# Patient Record
Sex: MALE | Race: WHITE | NOT HISPANIC OR LATINO | Employment: OTHER | URBAN - METROPOLITAN AREA
[De-identification: names, ages, dates, MRNs, and addresses within clinical notes are randomized per-mention and may not be internally consistent; named-entity substitution may affect disease eponyms.]

---

## 2017-01-09 ENCOUNTER — PATIENT MESSAGE (OUTPATIENT)
Dept: INTERNAL MEDICINE | Facility: CLINIC | Age: 72
End: 2017-01-09

## 2017-01-10 ENCOUNTER — DOCUMENTATION ONLY (OUTPATIENT)
Dept: INTERNAL MEDICINE | Facility: CLINIC | Age: 72
End: 2017-01-10

## 2017-01-10 NOTE — PROGRESS NOTES
Tried to call pt on number given in e-mail, no answer.  Left message to call clinic with any concerns, discuss what labs to order.  Also advised again that can have labs drawn immediately upon bryan, lab and I am available starting 7:30 AM

## 2017-01-11 ENCOUNTER — OFFICE VISIT (OUTPATIENT)
Dept: INTERNAL MEDICINE | Facility: CLINIC | Age: 72
End: 2017-01-11
Payer: MEDICARE

## 2017-01-11 ENCOUNTER — DOCUMENTATION ONLY (OUTPATIENT)
Dept: INTERNAL MEDICINE | Facility: CLINIC | Age: 72
End: 2017-01-11

## 2017-01-11 VITALS
DIASTOLIC BLOOD PRESSURE: 61 MMHG | SYSTOLIC BLOOD PRESSURE: 110 MMHG | BODY MASS INDEX: 27.72 KG/M2 | HEIGHT: 74 IN | HEART RATE: 64 BPM | WEIGHT: 216 LBS

## 2017-01-11 DIAGNOSIS — E03.9 HYPOTHYROIDISM, UNSPECIFIED TYPE: ICD-10-CM

## 2017-01-11 DIAGNOSIS — Z87.438 HISTORY OF BPH: ICD-10-CM

## 2017-01-11 DIAGNOSIS — N18.30 CHRONIC RENAL FAILURE, STAGE 3 (MODERATE): ICD-10-CM

## 2017-01-11 DIAGNOSIS — I10 ESSENTIAL HYPERTENSION: Primary | ICD-10-CM

## 2017-01-11 DIAGNOSIS — E03.9 HYPOTHYROIDISM, UNSPECIFIED TYPE: Primary | ICD-10-CM

## 2017-01-11 DIAGNOSIS — Z00.00 PREVENTATIVE HEALTH CARE: Primary | ICD-10-CM

## 2017-01-11 DIAGNOSIS — R74.8 ABNORMAL LIVER ENZYMES: ICD-10-CM

## 2017-01-11 DIAGNOSIS — E78.5 HYPERLIPIDEMIA, UNSPECIFIED HYPERLIPIDEMIA TYPE: ICD-10-CM

## 2017-01-11 PROCEDURE — 99213 OFFICE O/P EST LOW 20 MIN: CPT | Mod: PBBFAC | Performed by: FAMILY MEDICINE

## 2017-01-11 PROCEDURE — 1157F ADVNC CARE PLAN IN RCRD: CPT | Mod: ,,, | Performed by: FAMILY MEDICINE

## 2017-01-11 PROCEDURE — 3074F SYST BP LT 130 MM HG: CPT | Mod: ,,, | Performed by: FAMILY MEDICINE

## 2017-01-11 PROCEDURE — 99999 PR PBB SHADOW E&M-EST. PATIENT-LVL III: CPT | Mod: PBBFAC,,, | Performed by: FAMILY MEDICINE

## 2017-01-11 PROCEDURE — 3078F DIAST BP <80 MM HG: CPT | Mod: ,,, | Performed by: FAMILY MEDICINE

## 2017-01-11 PROCEDURE — 1126F AMNT PAIN NOTED NONE PRSNT: CPT | Mod: ,,, | Performed by: FAMILY MEDICINE

## 2017-01-11 PROCEDURE — 99214 OFFICE O/P EST MOD 30 MIN: CPT | Mod: S$PBB,,, | Performed by: FAMILY MEDICINE

## 2017-01-11 PROCEDURE — 1159F MED LIST DOCD IN RCRD: CPT | Mod: ,,, | Performed by: FAMILY MEDICINE

## 2017-01-11 NOTE — MR AVS SNAPSHOT
[facility] - Internal Medicine  [address]  [facility] [address]  Phone: [phone]  Fax: [phone]                  Cesar Kapadia Jr.   2017 11:00 AM   Office Visit    Description:  Male : 1945   Provider:  Julio Blankenship MD   Department:  [facility] - Internal Medicine           Reason for Visit     Annual Exam           Diagnoses this Visit        Comments    Essential hypertension    -  Primary     Hypothyroidism, unspecified type         History of BPH         Hyperlipidemia, unspecified hyperlipidemia type         Abnormal liver enzymes         Chronic renal failure, stage 3 (moderate)                To Do List           Future Appointments        Provider Department Dept Phone    2017 10:20 AM NELSON Nino MD [place] - Ophthalmology [phone]      Goals (5 Years of Data)     None      Follow-Up and Disposition     Return in about 6 months (around 2017), or if symptoms worsen or fail to improve.      [facility] On Call     [facility] On Call Nurse Care Line -  Assistance  Registered nurses in the [facility] On Call Center provide clinical advisement, health education, appointment booking, and other advisory services.  Call for this free service at [phone].             Medications           Message regarding Medications     Verify the changes and/or additions to your medication regime listed below are the same as discussed with your clinician today.  If any of these changes or additions are incorrect, please notify your healthcare provider.             Verify that the below list of medications is an accurate representation of the medications you are currently taking.  If none reported, the list may be blank. If incorrect, please contact your healthcare provider. Carry this list with you in case of emergency.           Current Medications     ANTIOX#10/OM3/DHA/EPA/LUT/ZEAX (I-CAPS ORAL) Take 1 tablet by mouth 2 (two) times daily.     carvedilol (COREG)  "12.5 MG tablet Take 1 tablet (12.5 mg total) by mouth 2 (two) times daily with meals.    enalapril (VASOTEC) 20 MG tablet Take 1 tablet (20 mg total) by mouth once daily.    hydrochlorothiazide (HYDRODIURIL) 25 MG tablet Take 1 tablet (25 mg total) by mouth once daily.    ketoconazole (NIZORAL) 2 % shampoo Apply topically every other day.    levothyroxine (SYNTHROID) 25 MCG tablet Take 1 tablet (25 mcg total) by mouth once daily.    ofloxacin (OCUFLOX) 0.3 % ophthalmic solution Place 1 drop into the left eye 3 (three) times daily.    prednisoLONE acetate (PRED FORTE) 1 % DrpS Place 1 drop into the left eye 3 (three) times daily.    rosuvastatin (CRESTOR) 10 MG tablet Take 1 tablet (10 mg total) by mouth once daily.    ZOSTAVAX, PF, 19,400 unit/0.65 mL injection            Clinical Reference Information           Vital Signs - Last Recorded  Most recent update: 1/11/2017 11:02 AM by Julio Blankenship MD    BP Pulse Ht Wt BMI    110/61 64 6' 2" (1.88 m) 98 kg (216 lb) 27.73 kg/m2      Blood Pressure          Most Recent Value    BP  110/61      Allergies as of 1/11/2017     No Known Allergies      Immunizations Administered on Date of Encounter - 1/11/2017     None      "

## 2017-01-11 NOTE — PROGRESS NOTES
Called pt, left detailed ms that labs look good, TSH a little high T4 wnl, asked to use thyroid pill alone, next labs 4-6 mo.  Can call or e-mail with questions.

## 2017-01-11 NOTE — PROGRESS NOTES
Subjective:       Patient ID: Cesar Kapadia Jr. is a 72 y.o. male.    Chief Complaint: Annual Exam  Cesar Kapadia Jr. 72 y.o. male is here for office visit to review care and physical exam, here for usual care.  Feels baseline.  Did have recent URI, had three things prescribed, symptoms resolved.  Uses prescription meds as usual otherwise.  Note had usual labs this AM, Cr slightly higher as well as ALT.      HPI  Review of Systems   Constitutional: Negative for activity change, appetite change, fatigue, fever and unexpected weight change.   HENT: Negative for congestion, hearing loss, postnasal drip and rhinorrhea.    Eyes: Negative for pain, discharge and visual disturbance.   Respiratory: Negative for cough, choking and shortness of breath.    Cardiovascular: Negative for chest pain, palpitations and leg swelling.   Gastrointestinal: Negative for abdominal pain, diarrhea and vomiting.   Genitourinary: Negative for dysuria, flank pain, hematuria and urgency.   Musculoskeletal: Negative for arthralgias, back pain, joint swelling and neck pain.   Skin: Negative for color change and rash.   Neurological: Negative for dizziness, tremors, syncope, weakness, numbness and headaches.   Psychiatric/Behavioral: Negative for agitation and confusion. The patient is not hyperactive.        Objective:      Physical Exam   Constitutional: He is oriented to person, place, and time. He appears well-developed and well-nourished.   HENT:   Head: Normocephalic.   Eyes: EOM are normal. Pupils are equal, round, and reactive to light.   Neck: Normal range of motion. Neck supple. No thyromegaly present.   Cardiovascular: Normal rate.  Exam reveals no gallop and no friction rub.    No murmur heard.  Pulmonary/Chest: Effort normal. No respiratory distress. He has no wheezes.   Abdominal: Soft. Bowel sounds are normal. He exhibits no mass. There is no tenderness.   Musculoskeletal: He exhibits no edema or tenderness.   Lymphadenopathy:      He has no cervical adenopathy.   Neurological: He is alert and oriented to person, place, and time. He has normal reflexes. No cranial nerve deficit.   Skin: Skin is warm. No rash noted.   Psychiatric: He has a normal mood and affect. His behavior is normal.       Assessment:       No diagnosis found.    Plan:       Cesar was seen today for annual exam.    Diagnoses and all orders for this visit:    Essential hypertension  - controle, follow at home, report  Hypothyroidism, unspecified type  - reviewed  History of BPH  - Follow  Hyperlipidemia, unspecified hyperlipidemia type  -review  Abnormal liver enzymes  - repeat 2-4 weeks, avoid otc meds  Chronic renal failure, stage 3 (moderate)  - may be dehydrated, repeat 2-4 weeks

## 2017-01-12 ENCOUNTER — PROCEDURE VISIT (OUTPATIENT)
Dept: OPHTHALMOLOGY | Facility: CLINIC | Age: 72
End: 2017-01-12
Attending: OPHTHALMOLOGY
Payer: MEDICARE

## 2017-01-12 VITALS — HEART RATE: 72 BPM | SYSTOLIC BLOOD PRESSURE: 94 MMHG | DIASTOLIC BLOOD PRESSURE: 57 MMHG

## 2017-01-12 DIAGNOSIS — H35.3220 EXUDATIVE AGE-RELATED MACULAR DEGENERATION OF LEFT EYE: ICD-10-CM

## 2017-01-12 DIAGNOSIS — H35.723 RETINAL PIGMENT EPITHELIAL DETACHMENT, BILATERAL: ICD-10-CM

## 2017-01-12 DIAGNOSIS — H35.3221 EXUDATIVE AGE-RELATED MACULAR DEGENERATION OF LEFT EYE WITH ACTIVE CHOROIDAL NEOVASCULARIZATION: Primary | ICD-10-CM

## 2017-01-12 DIAGNOSIS — H47.011 ANTERIOR ISCHEMIC OPTIC NEUROPATHY, RIGHT: ICD-10-CM

## 2017-01-12 PROCEDURE — 67028 INJECTION EYE DRUG: CPT | Mod: PBBFAC,PO,LT | Performed by: OPHTHALMOLOGY

## 2017-01-12 PROCEDURE — 67028 INJECTION EYE DRUG: CPT | Mod: PBBFAC

## 2017-01-12 PROCEDURE — 92014 COMPRE OPH EXAM EST PT 1/>: CPT | Mod: 25,S$PBB,, | Performed by: OPHTHALMOLOGY

## 2017-01-12 PROCEDURE — 67028 INJECTION EYE DRUG: CPT | Mod: S$PBB,LT,, | Performed by: OPHTHALMOLOGY

## 2017-01-12 PROCEDURE — 92134 CPTRZ OPH DX IMG PST SGM RTA: CPT | Mod: PBBFAC,PO | Performed by: OPHTHALMOLOGY

## 2017-01-12 RX ADMIN — AFLIBERCEPT 2 MG: 40 INJECTION, SOLUTION INTRAVITREAL at 11:01

## 2017-01-12 NOTE — PATIENT INSTRUCTIONS

## 2017-01-12 NOTE — PROGRESS NOTES
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 FVPED OD - s/p Eylea x 13  With subretinal fibrosis - OD, also history of AION.   Increase SRH/ARF and CME OD 10/13  Now stable CME OD - reactive from SR Fibrosis  In combination with AION, potential limited.  Try observation again OD    2. - Wet ARMD w CNV OS  - Pt responding to Avastin; ; However, Pt responds poorly to extend on Avastin.  -- Pt interested in switching to Eylea -  However, cystoid edema likely response to SR fibrosis, rather than sylvia new CME  Will do maintenance therapy OS - but try extension  S/p Eylea OS x20  s/p Avastin #14    6/16/14 - Pt presented with decreased Va OS from 20/40-20/80  No increase in SRF, fibrosis grossly stable, CME improved  Will monitor closely    7/14/14 - Va OS returned to normal over last month    5/15 - Worsened OS   Resumed q 4 week tx OS  WIll need tighter maintenance - will try alternating therapy     5/16 - stable resume 3 month maintenance    Eylea OS    -Patient visited [employer] and has magnifying glasses that help       3. H/o AION OD    4. NS OD  PCIOL OS - great result     5. Glc suspect - good IOP    Resume extension    3 months OCT      Risks, benefits, and alternatives to treatment discussed in detail with the patient.  The patient voiced understanding and wished to proceed with the procedure    Injection Procedure Note:  Diagnosis: Wet AMD OS    Topical Proparacaine and Betadine.  Inject Eylea OS at 6:00 @ 3.5-4mm posterior to limbus  Post Operative Dx: Same  Complications: None  Follow up as above.

## 2017-01-12 NOTE — MR AVS SNAPSHOT
[place] - Ophthalmology   [facility]  [place] [address]  Phone: [phone]  Fax: [phone]                  Cesar Kapadia Jr.   2017 10:20 AM   Procedure visit    Description:  Male : 1945   Provider:  NELSON Nino MD   Department:  [place] - Ophthalmology           Reason for Visit     Eye Problem           Diagnoses this Visit        Comments    Exudative age-related macular degeneration of left eye with active choroidal neovascularization    -  Primary     Exudative age-related macular degeneration of left eye         Retinal pigment epithelial detachment, bilateral         Anterior ischemic optic neuropathy, right                To Do List           Goals (5 Years of Data)     None      Follow-Up and Disposition     Return in about 3 months (around 2017).      [facility] On Call     [facility] On Call Nurse Care Line -  Assistance  Registered nurses in the [facility] On Call Center provide clinical advisement, health education, appointment booking, and other advisory services.  Call for this free service at [phone].             Medications           Message regarding Medications     Verify the changes and/or additions to your medication regime listed below are the same as discussed with your clinician today.  If any of these changes or additions are incorrect, please notify your healthcare provider.        These medications were administered today        Dose Freq    aflibercept Soln 2 mg 2 mg Clinic/HOD 1 time    Si.05 mLs (2 mg total) by Intravitreal route one time.    Class: Normal    Route: Intravitreal           Verify that the below list of medications is an accurate representation of the medications you are currently taking.  If none reported, the list may be blank. If incorrect, please contact your healthcare provider. Carry this list with you in case of emergency.           Current Medications     ANTIOX#10/OM3/DHA/EPA/LUT/ZEAX (I-CAPS  ORAL) Take 1 tablet by mouth 2 (two) times daily.     carvedilol (COREG) 12.5 MG tablet Take 1 tablet (12.5 mg total) by mouth 2 (two) times daily with meals.    enalapril (VASOTEC) 20 MG tablet Take 1 tablet (20 mg total) by mouth once daily.    hydrochlorothiazide (HYDRODIURIL) 25 MG tablet Take 1 tablet (25 mg total) by mouth once daily.    ketoconazole (NIZORAL) 2 % shampoo Apply topically every other day.    levothyroxine (SYNTHROID) 25 MCG tablet Take 1 tablet (25 mcg total) by mouth once daily.    ofloxacin (OCUFLOX) 0.3 % ophthalmic solution Place 1 drop into the left eye 3 (three) times daily.    prednisoLONE acetate (PRED FORTE) 1 % DrpS Place 1 drop into the left eye 3 (three) times daily.    rosuvastatin (CRESTOR) 10 MG tablet Take 1 tablet (10 mg total) by mouth once daily.    ZOSTAVAX, PF, 19,400 unit/0.65 mL injection            Clinical Reference Information           Vital Signs - Last Recorded  Most recent update: 1/12/2017 10:45 AM by Petty Harris    BP Pulse                (!) 94/57 (BP Location: Right arm, Patient Position: Sitting, BP Method: Automatic) 72          Blood Pressure          Most Recent Value    BP  (!)  94/57      Allergies as of 1/12/2017     No Known Allergies      Immunizations Administered on Date of Encounter - 1/12/2017     None      Orders Placed During Today's Visit      Normal Orders This Visit    Posterior Segment OCT Retina-Both eyes     Prior Authorization Order     Future Labs/Procedures Expected by Expires    Posterior Segment OCT Retina-Both eyes  As directed 1/12/2018

## 2017-04-13 ENCOUNTER — PROCEDURE VISIT (OUTPATIENT)
Dept: OPHTHALMOLOGY | Facility: CLINIC | Age: 72
End: 2017-04-13
Payer: MEDICARE

## 2017-04-13 DIAGNOSIS — H47.011 ANTERIOR ISCHEMIC OPTIC NEUROPATHY, RIGHT: ICD-10-CM

## 2017-04-13 DIAGNOSIS — H35.3221 EXUDATIVE AGE-RELATED MACULAR DEGENERATION OF LEFT EYE WITH ACTIVE CHOROIDAL NEOVASCULARIZATION: Primary | ICD-10-CM

## 2017-04-13 DIAGNOSIS — H35.3220 EXUDATIVE AGE-RELATED MACULAR DEGENERATION OF LEFT EYE: ICD-10-CM

## 2017-04-13 PROCEDURE — 67028 INJECTION EYE DRUG: CPT | Mod: PBBFAC,PO,LT | Performed by: OPHTHALMOLOGY

## 2017-04-13 PROCEDURE — 92134 CPTRZ OPH DX IMG PST SGM RTA: CPT | Mod: PBBFAC,PO | Performed by: OPHTHALMOLOGY

## 2017-04-13 PROCEDURE — 92014 COMPRE OPH EXAM EST PT 1/>: CPT | Mod: 25,S$PBB,, | Performed by: OPHTHALMOLOGY

## 2017-04-13 PROCEDURE — 67028 INJECTION EYE DRUG: CPT | Mod: S$PBB,LT,, | Performed by: OPHTHALMOLOGY

## 2017-04-13 RX ADMIN — AFLIBERCEPT 2 MG: 40 INJECTION, SOLUTION INTRAVITREAL at 11:04

## 2017-04-13 NOTE — MR AVS SNAPSHOT
[place] - Ophthalmology   [facility]  [place] [address]  Phone: [phone]  Fax: [phone]                  Cesar Kapadia Jr.   2017 10:10 AM   Procedure visit    Description:  Male : 1945   Provider:  NELSON Nino MD   Department:  [place] - Ophthalmology           Reason for Visit     Macular Degeneration           Diagnoses this Visit        Comments    Exudative age-related macular degeneration of left eye with active choroidal neovascularization    -  Primary     Exudative age-related macular degeneration of left eye         Anterior ischemic optic neuropathy, right                To Do List           Goals (5 Years of Data)     None      Follow-Up and Disposition     Return in about 3 months (around 2017).      [facility] On Call     [facility] On Call Nurse Care Line -  Assistance  Unless otherwise directed by your provider, please contact [facility], our nurse care line that is available for  assistance.     Registered nurses in the [facility] On Call Center provide: appointment scheduling, clinical advisement, health education, and other advisory services.  Call: [phone] (toll free)               Medications           Message regarding Medications     Verify the changes and/or additions to your medication regime listed below are the same as discussed with your clinician today.  If any of these changes or additions are incorrect, please notify your healthcare provider.        These medications were administered today        Dose Freq    aflibercept Soln 2 mg 2 mg Clinic/HOD 1 time    Si.05 mLs (2 mg total) by Intravitreal route one time.    Class: Normal    Route: Intravitreal           Verify that the below list of medications is an accurate representation of the medications you are currently taking.  If none reported, the list may be blank. If incorrect, please contact your healthcare provider. Carry this list with you in case of  emergency.           Current Medications     ANTIOX#10/OM3/DHA/EPA/LUT/ZEAX (I-CAPS ORAL) Take 1 tablet by mouth 2 (two) times daily.     carvedilol (COREG) 12.5 MG tablet Take 1 tablet (12.5 mg total) by mouth 2 (two) times daily with meals.    enalapril (VASOTEC) 20 MG tablet Take 1 tablet (20 mg total) by mouth once daily.    hydrochlorothiazide (HYDRODIURIL) 25 MG tablet Take 1 tablet (25 mg total) by mouth once daily.    ketoconazole (NIZORAL) 2 % shampoo Apply topically every other day.    levothyroxine (SYNTHROID) 25 MCG tablet Take 1 tablet (25 mcg total) by mouth once daily.    ofloxacin (OCUFLOX) 0.3 % ophthalmic solution Place 1 drop into the left eye 3 (three) times daily.    prednisoLONE acetate (PRED FORTE) 1 % DrpS Place 1 drop into the left eye 3 (three) times daily.    rosuvastatin (CRESTOR) 10 MG tablet Take 1 tablet (10 mg total) by mouth once daily.    ZOSTAVAX, PF, 19,400 unit/0.65 mL injection            Clinical Reference Information           Allergies as of 4/13/2017     No Known Allergies      Immunizations Administered on Date of Encounter - 4/13/2017     None      Orders Placed During Today's Visit      Normal Orders This Visit    Posterior Segment OCT Retina-Both eyes     Prior Authorization Order     Future Labs/Procedures Expected by Expires    Posterior Segment OCT Retina-Both eyes  As directed 4/13/2018      Language Assistance Services     ATTENTION: Language assistance services are available, free of charge. Please call [phone].      ATENCIÓN: Si michelle ace, tiene a luu disposición servicios gratuitos de asistencia lingüística. Llame al [phone].     [facility] Ý: N?u b?n nói Ti?ng Vi?t, có các d?ch v? h? tr? ngôn ng? mi?n phí dành cho b?n. G?i s? [phone].         [place] - Ophthalmology complies with applicable Federal civil rights laws and does not discriminate on the basis of race, color, national origin, age, disability, or sex.

## 2017-04-13 NOTE — PROGRESS NOTES
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 FVPED OD - s/p Eylea x 13  With subretinal fibrosis - OD, also history of AION.   Increase SRH/ARF and CME OD 10/13  Now stable CME OD - reactive from SR Fibrosis  In combination with AION, potential limited.  Try observation again OD    2. - Wet ARMD w CNV OS  - Pt responding to Avastin; ; However, Pt responds poorly to extend on Avastin.  -- Pt interested in switching to Eylea -  However, cystoid edema likely response to SR fibrosis, rather than sylvia new CME  Will do maintenance therapy OS - but try extension  S/p Eylea OS x21  s/p Avastin #14    6/16/14 - Pt presented with decreased Va OS from 20/40-20/80  No increase in SRF, fibrosis grossly stable, CME improved  Will monitor closely    7/14/14 - Va OS returned to normal over last month    5/15 - Worsened OS   Resumed q 4 week tx OS  WIll need tighter maintenance - will try alternating therapy     5/16 - stable resume 3 month maintenance    Eylea OS    -Patient visited [employer] and has magnifying glasses that help       3. H/o AION OD    4. NS OD  PCIOL OS - great result     5. Glc suspect - good IOP    Resume extension    3 months OCT      Risks, benefits, and alternatives to treatment discussed in detail with the patient.  The patient voiced understanding and wished to proceed with the procedure    Injection Procedure Note:  Diagnosis: Wet AMD OS    Topical Proparacaine and Betadine.  Inject Eylea OS at 6:00 @ 3.5-4mm posterior to limbus  Post Operative Dx: Same  Complications: None  Follow up as above.

## 2017-04-13 NOTE — PATIENT INSTRUCTIONS

## 2017-07-24 ENCOUNTER — PATIENT MESSAGE (OUTPATIENT)
Dept: OPHTHALMOLOGY | Facility: CLINIC | Age: 72
End: 2017-07-24

## 2017-07-24 ENCOUNTER — PROCEDURE VISIT (OUTPATIENT)
Dept: OPHTHALMOLOGY | Facility: CLINIC | Age: 72
End: 2017-07-24
Payer: MEDICARE

## 2017-07-24 VITALS — SYSTOLIC BLOOD PRESSURE: 126 MMHG | DIASTOLIC BLOOD PRESSURE: 70 MMHG

## 2017-07-24 DIAGNOSIS — H35.3221 EXUDATIVE AGE-RELATED MACULAR DEGENERATION OF LEFT EYE WITH ACTIVE CHOROIDAL NEOVASCULARIZATION: Primary | ICD-10-CM

## 2017-07-24 DIAGNOSIS — H47.011 ANTERIOR ISCHEMIC OPTIC NEUROPATHY, RIGHT: ICD-10-CM

## 2017-07-24 DIAGNOSIS — H35.723 RETINAL PIGMENT EPITHELIAL DETACHMENT, BILATERAL: ICD-10-CM

## 2017-07-24 PROCEDURE — 92014 COMPRE OPH EXAM EST PT 1/>: CPT | Mod: 25,S$PBB,, | Performed by: OPHTHALMOLOGY

## 2017-07-24 PROCEDURE — 92134 CPTRZ OPH DX IMG PST SGM RTA: CPT | Mod: PBBFAC | Performed by: OPHTHALMOLOGY

## 2017-07-24 PROCEDURE — 67028 INJECTION EYE DRUG: CPT | Mod: PBBFAC,LT | Performed by: OPHTHALMOLOGY

## 2017-07-24 PROCEDURE — 67028 INJECTION EYE DRUG: CPT | Mod: S$PBB,LT,, | Performed by: OPHTHALMOLOGY

## 2017-07-24 RX ADMIN — AFLIBERCEPT 2 MG: 40 INJECTION, SOLUTION INTRAVITREAL at 02:07

## 2017-07-24 NOTE — PROGRESS NOTES
HPI     DLS: 04/13/2017 Pt states his VA OS has not changed.              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 FVPED OD - s/p Eylea x 13  With subretinal fibrosis - OD, also history of AION.   Increase SRH/ARF and CME OD 10/13  Now stable CME OD - reactive from SR Fibrosis  In combination with AION, potential limited.  Try observation again OD    2. - Wet ARMD w CNV OS  - Pt responding to Avastin; ; However, Pt responds poorly to extend on Avastin.  -- Pt interested in switching to Eylea -  However, cystoid edema likely response to SR fibrosis, rather than sylvia new CME  Will do maintenance therapy OS - but try extension  S/p Eylea OS x22  s/p Avastin #14    6/16/14 - Pt presented with decreased Va OS from 20/40-20/80  No increase in SRF, fibrosis grossly stable, CME improved  Will monitor closely    7/14/14 - Va OS returned to normal over last month    5/15 - Worsened OS   Resumed q 4 week tx OS  WIll need tighter maintenance - will try alternating therapy     5/16 - stable resume 3 month maintenance    Eylea OS    -Patient visited [employer] and has magnifying glasses that help       3. H/o AION OD    4. NS OD  PCIOL OS - great result     5. Glc suspect - good IOP    Resume extension    3 months OCT      Risks, benefits, and alternatives to treatment discussed in detail with the patient.  The patient voiced understanding and wished to proceed with the procedure    Injection Procedure Note:  Diagnosis: Wet AMD OS    Topical Proparacaine and Betadine.  Inject Eylea OS at 6:00 @ 3.5-4mm posterior to limbus  Post Operative Dx: Same  Complications: None  Follow up as above.

## 2017-07-24 NOTE — PATIENT INSTRUCTIONS
POST INTRAVITREAL INJECTION PATIENT INSTRUCTIONS   Below are some guidelines for what to expect after your treatment and additional care instructions.   * you may experience mild discomfort in your eye after the treatment. Your eye usually feels better within 24 to 48 hours after the procedure.   * you have been given drops that numb the surface of your eye.   DO NOT rub or touch your eye, DO NOT wear contacts until numbness goes away.   * you may experience small spots that appear in your field of vision, these are usually caused by an air bubble or from the medication. It taked a few hours or days for these to go away.   * use of sunglasses will help reduce light sensitivity and glare.   * DO NOT swim or put sink water ( tap water ) or swin for at least 24 hours after the injection   * If you have a gritty, burning, irritating or stinging feeling in the injected eye. This may be a result of the antiseptic used. Use artifical tears or eye lubricant ( from over- the- counter from your local pharmacy ). If you have some at home already please check the expiration date, so not to use anything contaminated in your eye. A cool pack over your eye brow above the injected eye may also relieve discomfort.   Call us right away or go to the Emergency Department if you have a dramatic decrease in vision or extreme pain in the eye.   [facility] [phone]

## 2017-08-15 RX ORDER — LEVOTHYROXINE SODIUM 25 UG/1
25 TABLET ORAL DAILY
Qty: 30 TABLET | Refills: 6 | Status: SHIPPED | OUTPATIENT
Start: 2017-08-15

## 2017-10-09 ENCOUNTER — PATIENT MESSAGE (OUTPATIENT)
Dept: INTERNAL MEDICINE | Facility: CLINIC | Age: 72
End: 2017-10-09

## 2017-10-21 ENCOUNTER — TELEPHONE (OUTPATIENT)
Dept: INTERNAL MEDICINE | Facility: CLINIC | Age: 72
End: 2017-10-21

## 2017-10-24 ENCOUNTER — PROCEDURE VISIT (OUTPATIENT)
Dept: OPHTHALMOLOGY | Facility: CLINIC | Age: 72
End: 2017-10-24
Payer: MEDICARE

## 2017-10-24 ENCOUNTER — LAB VISIT (OUTPATIENT)
Dept: LAB | Facility: HOSPITAL | Age: 72
End: 2017-10-24
Attending: FAMILY MEDICINE
Payer: MEDICARE

## 2017-10-24 ENCOUNTER — OFFICE VISIT (OUTPATIENT)
Dept: INTERNAL MEDICINE | Facility: CLINIC | Age: 72
End: 2017-10-24
Payer: MEDICARE

## 2017-10-24 VITALS — DIASTOLIC BLOOD PRESSURE: 60 MMHG | SYSTOLIC BLOOD PRESSURE: 99 MMHG | HEART RATE: 57 BPM

## 2017-10-24 VITALS
BODY MASS INDEX: 29.31 KG/M2 | HEART RATE: 64 BPM | DIASTOLIC BLOOD PRESSURE: 80 MMHG | HEIGHT: 74 IN | SYSTOLIC BLOOD PRESSURE: 128 MMHG | WEIGHT: 228.38 LBS | OXYGEN SATURATION: 98 %

## 2017-10-24 DIAGNOSIS — R74.8 ABNORMAL LIVER ENZYMES: ICD-10-CM

## 2017-10-24 DIAGNOSIS — M54.50 CHRONIC BILATERAL LOW BACK PAIN WITHOUT SCIATICA: ICD-10-CM

## 2017-10-24 DIAGNOSIS — I10 ESSENTIAL HYPERTENSION: ICD-10-CM

## 2017-10-24 DIAGNOSIS — H35.3221 EXUDATIVE AGE-RELATED MACULAR DEGENERATION OF LEFT EYE WITH ACTIVE CHOROIDAL NEOVASCULARIZATION: Primary | ICD-10-CM

## 2017-10-24 DIAGNOSIS — E03.9 HYPOTHYROIDISM, UNSPECIFIED TYPE: ICD-10-CM

## 2017-10-24 DIAGNOSIS — N18.30 CHRONIC RENAL FAILURE, STAGE 3 (MODERATE): ICD-10-CM

## 2017-10-24 DIAGNOSIS — I10 ESSENTIAL HYPERTENSION: Primary | ICD-10-CM

## 2017-10-24 DIAGNOSIS — H47.011 ANTERIOR ISCHEMIC OPTIC NEUROPATHY OF RIGHT EYE: ICD-10-CM

## 2017-10-24 DIAGNOSIS — E78.5 HYPERLIPIDEMIA, UNSPECIFIED HYPERLIPIDEMIA TYPE: ICD-10-CM

## 2017-10-24 DIAGNOSIS — G89.29 CHRONIC BILATERAL LOW BACK PAIN WITHOUT SCIATICA: ICD-10-CM

## 2017-10-24 DIAGNOSIS — H35.722 RETINAL PIGMENT EPITHELIAL DETACHMENT OF LEFT EYE: ICD-10-CM

## 2017-10-24 LAB
ALBUMIN SERPL BCP-MCNC: 3.8 G/DL
ALP SERPL-CCNC: 51 U/L
ALT SERPL W/O P-5'-P-CCNC: 21 U/L
ANION GAP SERPL CALC-SCNC: 7 MMOL/L
AST SERPL-CCNC: 19 U/L
BASOPHILS # BLD AUTO: 0.03 K/UL
BASOPHILS NFR BLD: 0.5 %
BILIRUB SERPL-MCNC: 0.8 MG/DL
BUN SERPL-MCNC: 16 MG/DL
CALCIUM SERPL-MCNC: 9.2 MG/DL
CHLORIDE SERPL-SCNC: 105 MMOL/L
CHOLEST SERPL-MCNC: 151 MG/DL
CHOLEST/HDLC SERPL: 2.6 {RATIO}
CO2 SERPL-SCNC: 30 MMOL/L
CREAT SERPL-MCNC: 1.2 MG/DL
DIFFERENTIAL METHOD: ABNORMAL
EOSINOPHIL # BLD AUTO: 0.2 K/UL
EOSINOPHIL NFR BLD: 2.5 %
ERYTHROCYTE [DISTWIDTH] IN BLOOD BY AUTOMATED COUNT: 14.6 %
EST. GFR  (AFRICAN AMERICAN): >60 ML/MIN/1.73 M^2
EST. GFR  (NON AFRICAN AMERICAN): 60 ML/MIN/1.73 M^2
GLUCOSE SERPL-MCNC: 152 MG/DL
HCT VFR BLD AUTO: 44 %
HDLC SERPL-MCNC: 57 MG/DL
HDLC SERPL: 37.7 %
HGB BLD-MCNC: 15.1 G/DL
LDLC SERPL CALC-MCNC: 80 MG/DL
LYMPHOCYTES # BLD AUTO: 1.8 K/UL
LYMPHOCYTES NFR BLD: 30.2 %
MCH RBC QN AUTO: 30 PG
MCHC RBC AUTO-ENTMCNC: 34.3 G/DL
MCV RBC AUTO: 88 FL
MONOCYTES # BLD AUTO: 0.7 K/UL
MONOCYTES NFR BLD: 11.1 %
NEUTROPHILS # BLD AUTO: 3.3 K/UL
NEUTROPHILS NFR BLD: 55.4 %
NONHDLC SERPL-MCNC: 94 MG/DL
PLATELET # BLD AUTO: 155 K/UL
PMV BLD AUTO: 11.5 FL
POTASSIUM SERPL-SCNC: 4 MMOL/L
PROT SERPL-MCNC: 6.8 G/DL
RBC # BLD AUTO: 5.03 M/UL
SODIUM SERPL-SCNC: 142 MMOL/L
T4 FREE SERPL-MCNC: 1.22 NG/DL
TRIGL SERPL-MCNC: 70 MG/DL
TSH SERPL DL<=0.005 MIU/L-ACNC: 4.53 UIU/ML
WBC # BLD AUTO: 5.96 K/UL

## 2017-10-24 PROCEDURE — 99214 OFFICE O/P EST MOD 30 MIN: CPT | Mod: S$PBB,,, | Performed by: FAMILY MEDICINE

## 2017-10-24 PROCEDURE — 92134 CPTRZ OPH DX IMG PST SGM RTA: CPT | Mod: PBBFAC | Performed by: OPHTHALMOLOGY

## 2017-10-24 PROCEDURE — 92014 COMPRE OPH EXAM EST PT 1/>: CPT | Mod: 25,S$PBB,, | Performed by: OPHTHALMOLOGY

## 2017-10-24 PROCEDURE — 84443 ASSAY THYROID STIM HORMONE: CPT

## 2017-10-24 PROCEDURE — 84439 ASSAY OF FREE THYROXINE: CPT

## 2017-10-24 PROCEDURE — 67028 INJECTION EYE DRUG: CPT | Mod: PBBFAC,LT | Performed by: OPHTHALMOLOGY

## 2017-10-24 PROCEDURE — 99214 OFFICE O/P EST MOD 30 MIN: CPT | Mod: PBBFAC | Performed by: FAMILY MEDICINE

## 2017-10-24 PROCEDURE — C9257 BEVACIZUMAB INJECTION: HCPCS | Mod: PBBFAC | Performed by: OPHTHALMOLOGY

## 2017-10-24 PROCEDURE — 67028 INJECTION EYE DRUG: CPT | Mod: S$PBB,LT,, | Performed by: OPHTHALMOLOGY

## 2017-10-24 PROCEDURE — 80061 LIPID PANEL: CPT

## 2017-10-24 PROCEDURE — 80053 COMPREHEN METABOLIC PANEL: CPT

## 2017-10-24 PROCEDURE — 99999 PR PBB SHADOW E&M-EST. PATIENT-LVL IV: CPT | Mod: PBBFAC,,, | Performed by: FAMILY MEDICINE

## 2017-10-24 PROCEDURE — 85025 COMPLETE CBC W/AUTO DIFF WBC: CPT

## 2017-10-24 PROCEDURE — 36415 COLL VENOUS BLD VENIPUNCTURE: CPT

## 2017-10-24 RX ADMIN — BEVACIZUMAB 1.25 MG: 100 INJECTION, SOLUTION INTRAVENOUS at 01:10

## 2017-10-24 NOTE — PROGRESS NOTES
Subjective:       Patient ID: Cesar Kapadia Jr. is a 72 y.o. male.    Chief Complaint: Back Pain (Mid Back 3 weeks)  Cesar Kapadia Jr. 72 y.o. male is here for office visit to review care and physical exam, reports doing well other than LBP, worse lately.  Plays golf, thinks riding cart was main aggravation.      HPI  Review of Systems   Constitutional: Negative for activity change, appetite change, fatigue, fever and unexpected weight change.   HENT: Negative for congestion, hearing loss, postnasal drip and rhinorrhea.    Eyes: Negative for pain, discharge and visual disturbance.   Respiratory: Negative for cough, choking and shortness of breath.    Cardiovascular: Negative for chest pain, palpitations and leg swelling.   Gastrointestinal: Negative for abdominal pain, diarrhea and vomiting.   Genitourinary: Negative for dysuria, flank pain, hematuria and urgency.   Musculoskeletal: Negative for arthralgias, back pain, joint swelling and neck pain.   Skin: Negative for color change and rash.   Neurological: Negative for dizziness, tremors, syncope, weakness, numbness and headaches.   Psychiatric/Behavioral: Negative for agitation and confusion. The patient is not hyperactive.        Objective:      Physical Exam   Constitutional: He is oriented to person, place, and time. He appears well-developed and well-nourished.   HENT:   Head: Normocephalic.   Eyes: EOM are normal. Pupils are equal, round, and reactive to light.   Neck: Normal range of motion. Neck supple. No thyromegaly present.   Cardiovascular: Normal rate.  Exam reveals no gallop and no friction rub.    No murmur heard.  Pulmonary/Chest: Effort normal. No respiratory distress. He has no wheezes.   Abdominal: Soft. Bowel sounds are normal. He exhibits no mass. There is no tenderness.   Musculoskeletal: He exhibits no edema or tenderness.   Lymphadenopathy:     He has no cervical adenopathy.   Neurological: He is alert and oriented to person, place, and  time. He has normal reflexes. No cranial nerve deficit.   Skin: Skin is warm. No rash noted.   Psychiatric: He has a normal mood and affect. His behavior is normal.       Assessment:       No diagnosis found.    Plan:       Cesar was seen today for back pain.    Diagnoses and all orders for this visit:    Essential hypertension  -     Comprehensive metabolic panel; Future    Hypothyroidism, unspecified type  -     TSH; Future    Hyperlipidemia, unspecified hyperlipidemia type  -     Comprehensive metabolic panel; Future  -     Lipid panel; Future    Chronic renal failure, stage 3 (moderate)  -     Comprehensive metabolic panel; Future  -     CBC auto differential; Future    Abnormal liver enzymes  -     Comprehensive metabolic panel; Future    Chronic bilateral low back pain without sciatica  -     Ambulatory Referral to Physical/Occupational Therapy

## 2017-10-24 NOTE — PATIENT INSTRUCTIONS

## 2017-10-24 NOTE — PROGRESS NOTES
HPI     3 mo check/Eylea OS   DLS- 07/24/2017 Dr. Nino     Pt sts no change in va. Denies pain.  (-)Flashes (+)Floaters  (-)Photophobia  (-)Glare    Denies use of gtts     HPI     DLS: 04/13/2017 Pt states his VA OS has not changed.              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 FVPED OD - s/p Eylea x 13  With subretinal fibrosis - OD, also history of AION.   Increase SRH/ARF and CME OD 10/13  Now stable CME OD - reactive from SR Fibrosis  In combination with AION, potential limited.  Try observation again OD    2. - Wet ARMD w CNV OS  - Pt responding to Avastin; ; However, Pt responds poorly to extend on Avastin.  -- Pt interested in switching to Eylea -  However, cystoid edema likely response to SR fibrosis, rather than sylvia new CME  Will do maintenance therapy OS - but try extension  S/p Eylea OS x23  s/p Avastin #14    6/16/14 - Pt presented with decreased Va OS from 20/40-20/80  No increase in SRF, fibrosis grossly stable, CME improved  Will monitor closely    7/14/14 - Va OS returned to normal over last month    5/15 - Worsened OS   Resumed q 4 week tx OS  WIll need tighter maintenance - will try alternating therapy     5/16 - stable resume 3 month maintenance    Avastin OS    -Patient visited [facility] and has magnifying glasses that help       3. H/o AION OD    4. NS OD  PCIOL OS - great result     5. Glc suspect - good IOP    Resume extension    3 months OCT      Risks, benefits, and alternatives to treatment discussed in detail with the patient.  The patient voiced understanding and wished to proceed with the procedure    Injection Procedure Note:  Diagnosis: Wet AMD OS    Topical Proparacaine and Betadine.  Inject Avastin OS at 6:00 @ 3.5-4mm posterior to limbus  Post Operative Dx: Same  Complications: None  Follow up as above.

## 2017-11-02 ENCOUNTER — PATIENT MESSAGE (OUTPATIENT)
Dept: INTERNAL MEDICINE | Facility: CLINIC | Age: 72
End: 2017-11-02

## 2017-11-05 DIAGNOSIS — Z13.9 ENCOUNTER FOR SCREENING: Primary | ICD-10-CM

## 2017-11-20 ENCOUNTER — PATIENT MESSAGE (OUTPATIENT)
Dept: CARDIOLOGY | Facility: CLINIC | Age: 72
End: 2017-11-20

## 2017-11-20 DIAGNOSIS — E78.5 HYPERLIPIDEMIA, UNSPECIFIED HYPERLIPIDEMIA TYPE: ICD-10-CM

## 2017-11-20 DIAGNOSIS — I10 ESSENTIAL HYPERTENSION: ICD-10-CM

## 2017-11-20 DIAGNOSIS — R94.39 ABNORMAL STRESS TEST: ICD-10-CM

## 2017-11-20 RX ORDER — HYDROCHLOROTHIAZIDE 25 MG/1
25 TABLET ORAL DAILY
Qty: 30 TABLET | Refills: 0 | Status: SHIPPED | OUTPATIENT
Start: 2017-11-20 | End: 2018-01-02 | Stop reason: SDUPTHER

## 2017-11-20 RX ORDER — CARVEDILOL 12.5 MG/1
12.5 TABLET ORAL 2 TIMES DAILY WITH MEALS
Qty: 60 TABLET | Refills: 0 | Status: SHIPPED | OUTPATIENT
Start: 2017-11-20 | End: 2018-01-02 | Stop reason: SDUPTHER

## 2017-11-20 RX ORDER — ENALAPRIL MALEATE 20 MG/1
20 TABLET ORAL DAILY
Qty: 30 TABLET | Refills: 0 | Status: SHIPPED | OUTPATIENT
Start: 2017-11-20 | End: 2018-01-02 | Stop reason: SDUPTHER

## 2017-11-20 RX ORDER — ROSUVASTATIN CALCIUM 10 MG/1
10 TABLET, COATED ORAL DAILY
Qty: 30 TABLET | Refills: 0 | Status: SHIPPED | OUTPATIENT
Start: 2017-11-20 | End: 2018-01-02 | Stop reason: SDUPTHER

## 2017-11-20 NOTE — TELEPHONE ENCOUNTER
----- Message from Jovani ELVER Miriam sent at 11/20/2017  8:27 AM CST -----  Contact: [facility]/Adrian Campbell called in and stated patient is leaving for vacation in 2 hours and is insisting on getting his Rx:    1. HCTZ 25 mg, 1 x daily  2.  Vasotec 20 mg, 1 x daily  3.  Coreg 12.5 mg, 1 pill 2 x daily  4.  Crestor 10 mg, 1 x daily    [facility] [address] [place] [id number]  Phone: [phone] Fax: [phone]

## 2017-12-29 ENCOUNTER — PATIENT MESSAGE (OUTPATIENT)
Dept: CARDIOLOGY | Facility: CLINIC | Age: 72
End: 2017-12-29

## 2018-01-02 DIAGNOSIS — E78.5 HYPERLIPIDEMIA, UNSPECIFIED HYPERLIPIDEMIA TYPE: ICD-10-CM

## 2018-01-02 DIAGNOSIS — R94.39 ABNORMAL STRESS TEST: ICD-10-CM

## 2018-01-02 DIAGNOSIS — I10 ESSENTIAL HYPERTENSION: ICD-10-CM

## 2018-01-02 RX ORDER — HYDROCHLOROTHIAZIDE 25 MG/1
25 TABLET ORAL DAILY
Qty: 30 TABLET | Refills: 0 | Status: SHIPPED | OUTPATIENT
Start: 2018-01-02 | End: 2018-01-22 | Stop reason: SDUPTHER

## 2018-01-02 RX ORDER — ENALAPRIL MALEATE 20 MG/1
20 TABLET ORAL DAILY
Qty: 30 TABLET | Refills: 0 | Status: SHIPPED | OUTPATIENT
Start: 2018-01-02 | End: 2018-01-22 | Stop reason: SDUPTHER

## 2018-01-02 RX ORDER — ROSUVASTATIN CALCIUM 10 MG/1
10 TABLET, COATED ORAL DAILY
Qty: 30 TABLET | Refills: 0 | Status: SHIPPED | OUTPATIENT
Start: 2018-01-02 | End: 2018-01-22 | Stop reason: SDUPTHER

## 2018-01-02 RX ORDER — CARVEDILOL 12.5 MG/1
12.5 TABLET ORAL 2 TIMES DAILY WITH MEALS
Qty: 60 TABLET | Refills: 0 | Status: SHIPPED | OUTPATIENT
Start: 2018-01-02 | End: 2018-01-22 | Stop reason: SDUPTHER

## 2018-01-22 ENCOUNTER — OFFICE VISIT (OUTPATIENT)
Dept: CARDIOLOGY | Facility: CLINIC | Age: 73
End: 2018-01-22
Payer: MEDICARE

## 2018-01-22 VITALS
BODY MASS INDEX: 28.35 KG/M2 | WEIGHT: 220.88 LBS | DIASTOLIC BLOOD PRESSURE: 76 MMHG | HEART RATE: 60 BPM | HEIGHT: 74 IN | SYSTOLIC BLOOD PRESSURE: 125 MMHG

## 2018-01-22 DIAGNOSIS — R94.39 ABNORMAL STRESS TEST: ICD-10-CM

## 2018-01-22 DIAGNOSIS — E78.5 HYPERLIPIDEMIA, UNSPECIFIED HYPERLIPIDEMIA TYPE: ICD-10-CM

## 2018-01-22 DIAGNOSIS — I10 ESSENTIAL HYPERTENSION: Primary | ICD-10-CM

## 2018-01-22 PROCEDURE — 99213 OFFICE O/P EST LOW 20 MIN: CPT | Mod: PBBFAC,PO | Performed by: INTERNAL MEDICINE

## 2018-01-22 PROCEDURE — 99214 OFFICE O/P EST MOD 30 MIN: CPT | Mod: S$PBB,,, | Performed by: INTERNAL MEDICINE

## 2018-01-22 PROCEDURE — 99999 PR PBB SHADOW E&M-EST. PATIENT-LVL III: CPT | Mod: PBBFAC,,, | Performed by: INTERNAL MEDICINE

## 2018-01-22 RX ORDER — CARVEDILOL 12.5 MG/1
12.5 TABLET ORAL 2 TIMES DAILY WITH MEALS
Qty: 180 TABLET | Refills: 3 | Status: SHIPPED | OUTPATIENT
Start: 2018-01-22 | End: 2019-01-25 | Stop reason: SDUPTHER

## 2018-01-22 RX ORDER — HYDROCHLOROTHIAZIDE 25 MG/1
25 TABLET ORAL DAILY
Qty: 90 TABLET | Refills: 3 | Status: SHIPPED | OUTPATIENT
Start: 2018-01-22 | End: 2019-01-25 | Stop reason: SDUPTHER

## 2018-01-22 RX ORDER — ROSUVASTATIN CALCIUM 10 MG/1
10 TABLET, COATED ORAL DAILY
Qty: 90 TABLET | Refills: 3 | Status: SHIPPED | OUTPATIENT
Start: 2018-01-22 | End: 2018-07-31

## 2018-01-22 RX ORDER — ENALAPRIL MALEATE 20 MG/1
20 TABLET ORAL DAILY
Qty: 90 TABLET | Refills: 3 | Status: SHIPPED | OUTPATIENT
Start: 2018-01-22 | End: 2019-01-25 | Stop reason: SDUPTHER

## 2018-01-22 NOTE — PROGRESS NOTES
Subjective:    Patient ID:  Cesar Kapadia Jr. is a 73 y.o. male who presents for evaluation of Follow-up (follow up )      HPI 71 yo WM with HLD and HTN. Had CACS that was <100. Had stress echo that was read as abnormal but cath showed nonobstructive CAD. Did have an SVT during the stress test.Last visit coreg was increased because of elevated BP. Also HCTZ added.Currently doing well.Denies chest pain, SOB, or edema Denies palpitations, weak spells, and syncope    Review of Systems   Constitution: Negative for decreased appetite, fever, weakness, malaise/fatigue, weight gain and weight loss.   HENT: Negative for hearing loss and nosebleeds.    Eyes: Negative for visual disturbance.   Cardiovascular: Negative for chest pain, claudication, cyanosis, dyspnea on exertion, irregular heartbeat, leg swelling, near-syncope, orthopnea, palpitations, paroxysmal nocturnal dyspnea and syncope.   Respiratory: Negative for cough, hemoptysis, shortness of breath, sleep disturbances due to breathing, snoring and wheezing.    Endocrine: Negative for cold intolerance, heat intolerance, polydipsia and polyuria.   Hematologic/Lymphatic: Negative for adenopathy and bleeding problem. Does not bruise/bleed easily.   Skin: Negative for color change, itching, poor wound healing, rash and suspicious lesions.   Musculoskeletal: Negative for arthritis, back pain, falls, joint pain, joint swelling, muscle cramps, muscle weakness and myalgias.   Gastrointestinal: Negative for bloating, abdominal pain, change in bowel habit, constipation, flatus, heartburn, hematemesis, hematochezia, hemorrhoids, jaundice, melena, nausea and vomiting.   Genitourinary: Negative for bladder incontinence, decreased libido, frequency, hematuria, hesitancy and urgency.   Neurological: Negative for brief paralysis, difficulty with concentration, excessive daytime sleepiness, dizziness, focal weakness, headaches, light-headedness, loss of balance, numbness and  "vertigo.   Psychiatric/Behavioral: Negative for altered mental status, depression and memory loss. The patient does not have insomnia and is not nervous/anxious.    Allergic/Immunologic: Negative for environmental allergies, hives and persistent infections.        Objective:    Physical Exam   Constitutional: He is oriented to person, place, and time. He appears well-developed and well-nourished. No distress.   /76 (BP Location: Left arm, Patient Position: Sitting, BP Method: Large (Automatic))   Pulse 60   Ht 6' 2" (1.88 m)   Wt 100.2 kg (220 lb 14.4 oz)   BMI 28.36 kg/m²      HENT:   Head: Normocephalic and atraumatic.   Eyes: Conjunctivae and lids are normal. Pupils are equal, round, and reactive to light. Right eye exhibits no discharge. No scleral icterus.   Neck: Normal range of motion. Neck supple. No JVD present. No tracheal deviation present. No thyromegaly present.   Cardiovascular: Normal rate, regular rhythm, S1 normal, S2 normal, normal heart sounds and intact distal pulses.  Exam reveals no gallop and no friction rub.    No murmur heard.  Pulses:       Carotid pulses are 2+ on the right side, and 2+ on the left side.       Radial pulses are 2+ on the right side, and 2+ on the left side.        Femoral pulses are 2+ on the right side, and 2+ on the left side.       Popliteal pulses are 2+ on the right side, and 2+ on the left side.        Dorsalis pedis pulses are 2+ on the right side, and 2+ on the left side.        Posterior tibial pulses are 2+ on the right side, and 2+ on the left side.   Pulmonary/Chest: Effort normal and breath sounds normal. No respiratory distress. He has no wheezes. He has no rales. He exhibits no tenderness.   Abdominal: Soft. Bowel sounds are normal. He exhibits no distension and no mass. There is no hepatosplenomegaly or hepatomegaly. There is no tenderness. There is no rebound and no guarding.   Musculoskeletal: Normal range of motion. He exhibits no edema or " tenderness.   Lymphadenopathy:     He has no cervical adenopathy.   Neurological: He is alert and oriented to person, place, and time. He has normal reflexes. No cranial nerve deficit. Coordination normal.   Skin: Skin is warm and dry. No rash noted. He is not diaphoretic. No erythema. No pallor.   Psychiatric: He has a normal mood and affect. His speech is normal and behavior is normal. Judgment and thought content normal. Cognition and memory are normal.         Assessment:       1. Essential hypertension    2. Hyperlipidemia, unspecified hyperlipidemia type    3. Abnormal stress test         Plan:     Doing well    The current medical regimen is effective;  continue present plan and medications.    Patient advised to modify risk factors such as weight, exercise, diet,  tobacco and alcohol exposure    Patient advised to limit exposure to caffeine, stimulants, and alcohol    Rx's reordered    No orders of the defined types were placed in this encounter.    Follow-up in about 1 year (around 1/22/2019).

## 2018-01-23 ENCOUNTER — PROCEDURE VISIT (OUTPATIENT)
Dept: OPHTHALMOLOGY | Facility: CLINIC | Age: 73
End: 2018-01-23
Payer: MEDICARE

## 2018-01-23 VITALS — HEART RATE: 73 BPM | DIASTOLIC BLOOD PRESSURE: 68 MMHG | SYSTOLIC BLOOD PRESSURE: 107 MMHG

## 2018-01-23 DIAGNOSIS — H35.3221 EXUDATIVE AGE-RELATED MACULAR DEGENERATION OF LEFT EYE WITH ACTIVE CHOROIDAL NEOVASCULARIZATION: Primary | ICD-10-CM

## 2018-01-23 DIAGNOSIS — H47.011 ANTERIOR ISCHEMIC OPTIC NEUROPATHY OF RIGHT EYE: ICD-10-CM

## 2018-01-23 DIAGNOSIS — H35.723 RETINAL PIGMENT EPITHELIAL DETACHMENT OF BOTH EYES: ICD-10-CM

## 2018-01-23 PROCEDURE — 92134 CPTRZ OPH DX IMG PST SGM RTA: CPT | Mod: PBBFAC | Performed by: OPHTHALMOLOGY

## 2018-01-23 PROCEDURE — 92014 COMPRE OPH EXAM EST PT 1/>: CPT | Mod: 25,S$PBB,, | Performed by: OPHTHALMOLOGY

## 2018-01-23 PROCEDURE — 67028 INJECTION EYE DRUG: CPT | Mod: PBBFAC,LT | Performed by: OPHTHALMOLOGY

## 2018-01-23 PROCEDURE — 67028 INJECTION EYE DRUG: CPT | Mod: S$PBB,LT,, | Performed by: OPHTHALMOLOGY

## 2018-01-23 RX ADMIN — AFLIBERCEPT 2 MG: 40 INJECTION, SOLUTION INTRAVITREAL at 12:01

## 2018-01-23 NOTE — PROGRESS NOTES
HPI     Eye Problem    Additional comments: 3 mos check           Comments   DLS 10/24/17- No changes x last visit. Some days vision clearer than   others      HPI     3 mo check/Eylea OS   DLS- 07/24/2017 Dr. Nino     Pt sts no change in va. Denies pain.  (-)Flashes (+)Floaters  (-)Photophobia  (-)Glare    Denies use of gtts     HPI     DLS: 04/13/2017 Pt states his VA OS has not changed.              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 FVPED OD - s/p Eylea x 13  With subretinal fibrosis - OD, also history of AION.   Increase SRH/ARF and CME OD 10/13  Now stable CME OD - reactive from SR Fibrosis  In combination with AION, potential limited.  Try observation again OD    2. - Wet ARMD w CNV OS  - Pt responding to Avastin; ; However, Pt responds poorly to extend on Avastin.  -- Pt interested in switching to Eylea -  However, cystoid edema likely response to SR fibrosis, rather than sylvia new CME  Will do maintenance therapy OS - but try extension  S/p Eylea OS x23  s/p Avastin #15    6/16/14 - Pt presented with decreased Va OS from 20/40-20/80  No increase in SRF, fibrosis grossly stable, CME improved  Will monitor closely    7/14/14 - Va OS returned to normal over last month    5/15 - Worsened OS   Resumed q 4 week tx OS  WIll need tighter maintenance - will try alternating therapy     5/16 - stable resume 3 month maintenance    Eylea OS    -Patient visited [facility] and has magnifying glasses that help       3. H/o AION OD    4. NS OD  PCIOL OS - great result     5. Glc suspect - good IOP    Resume extension    3 months OCT      Risks, benefits, and alternatives to treatment discussed in detail with the patient.  The patient voiced understanding and wished to proceed with the procedure    Injection Procedure Note:  Diagnosis: Wet AMD OS    Topical Proparacaine and Betadine.  Inject Eylea OS at 6:00 @ 3.5-4mm posterior to limbus  Post Operative Dx:  Same  Complications: None  Follow up as above.

## 2018-01-23 NOTE — PATIENT INSTRUCTIONS

## 2018-04-24 ENCOUNTER — PROCEDURE VISIT (OUTPATIENT)
Dept: OPHTHALMOLOGY | Facility: CLINIC | Age: 73
End: 2018-04-24
Payer: MEDICARE

## 2018-04-24 VITALS — DIASTOLIC BLOOD PRESSURE: 77 MMHG | HEART RATE: 65 BPM | SYSTOLIC BLOOD PRESSURE: 123 MMHG

## 2018-04-24 DIAGNOSIS — H35.723 RETINAL PIGMENT EPITHELIAL DETACHMENT OF BOTH EYES: ICD-10-CM

## 2018-04-24 DIAGNOSIS — H35.3212 EXUDATIVE AGE-RELATED MACULAR DEGENERATION OF RIGHT EYE WITH INACTIVE CHOROIDAL NEOVASCULARIZATION: ICD-10-CM

## 2018-04-24 DIAGNOSIS — H35.3221 EXUDATIVE AGE-RELATED MACULAR DEGENERATION OF LEFT EYE WITH ACTIVE CHOROIDAL NEOVASCULARIZATION: Primary | ICD-10-CM

## 2018-04-24 PROCEDURE — 92134 CPTRZ OPH DX IMG PST SGM RTA: CPT | Mod: PBBFAC | Performed by: OPHTHALMOLOGY

## 2018-04-24 PROCEDURE — 67028 INJECTION EYE DRUG: CPT | Mod: S$PBB,LT,, | Performed by: OPHTHALMOLOGY

## 2018-04-24 PROCEDURE — 92014 COMPRE OPH EXAM EST PT 1/>: CPT | Mod: 25,S$PBB,, | Performed by: OPHTHALMOLOGY

## 2018-04-24 PROCEDURE — 67028 INJECTION EYE DRUG: CPT | Mod: PBBFAC,LT | Performed by: OPHTHALMOLOGY

## 2018-04-24 RX ADMIN — AFLIBERCEPT 2 MG: 40 INJECTION, SOLUTION INTRAVITREAL at 11:04

## 2018-04-24 NOTE — PATIENT INSTRUCTIONS

## 2018-04-24 NOTE — PROGRESS NOTES
HPI     Eye Problem    Additional comments: 3 mos check           Comments   DLS 1/23/18- No changes noticed x last visit    HPI     Eye Problem    Additional comments: 3 mos check           Comments   DLS 10/24/17- No changes x last visit. Some days vision clearer than   others      HPI     3 mo check/Eylea OS   DLS- 07/24/2017 Dr. Nino     Pt sts no change in va. Denies pain.  (-)Flashes (+)Floaters  (-)Photophobia  (-)Glare    Denies use of gtts     HPI     DLS: 04/13/2017 Pt states his VA OS has not changed.              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 FVPED OD - s/p Eylea x 13  With subretinal fibrosis - OD, also history of AION.   Increase SRH/ARF and CME OD 10/13  Now stable CME OD - reactive from SR Fibrosis  In combination with AION, potential limited.  Try observation again OD    2. - Wet ARMD w CNV OS  - Pt responding to Avastin; ; However, Pt responds poorly to extend on Avastin.  -- Pt interested in switching to Eylea -  However, cystoid edema likely response to SR fibrosis, rather than sylvia new CME  Will do maintenance therapy OS - but try extension  S/p Eylea OS x23  s/p Avastin #15    6/16/14 - Pt presented with decreased Va OS from 20/40-20/80  No increase in SRF, fibrosis grossly stable, CME improved  Will monitor closely    7/14/14 - Va OS returned to normal over last month    5/15 - Worsened OS   Resumed q 4 week tx OS  WIll need tighter maintenance - will try alternating therapy     5/16 - stable resume 3 month maintenance  4/18 - stable on current regiment, try slow extension    Eylea OS    -[facility] and has [facility] glasses that help       3. H/o AION OD    4. NS OD  PCIOL OS - great result     5. Glc suspect - good IOP    Resume extension    14-15 weeks OCT      Risks, benefits, and alternatives to treatment discussed in detail with the patient.  The patient voiced understanding and wished to proceed with the  procedure    Injection Procedure Note:  Diagnosis: Wet AMD OS    Topical Proparacaine and Betadine.  Inject Eylea OS at 6:00 @ 3.5-4mm posterior to limbus  Post Operative Dx: Same  Complications: None  Follow up as above.

## 2018-07-31 ENCOUNTER — PROCEDURE VISIT (OUTPATIENT)
Dept: OPHTHALMOLOGY | Facility: CLINIC | Age: 73
End: 2018-07-31
Payer: MEDICARE

## 2018-07-31 VITALS — HEART RATE: 55 BPM | DIASTOLIC BLOOD PRESSURE: 69 MMHG | SYSTOLIC BLOOD PRESSURE: 115 MMHG

## 2018-07-31 DIAGNOSIS — H35.3212 EXUDATIVE AGE-RELATED MACULAR DEGENERATION OF RIGHT EYE WITH INACTIVE CHOROIDAL NEOVASCULARIZATION: ICD-10-CM

## 2018-07-31 DIAGNOSIS — H35.3221 EXUDATIVE AGE-RELATED MACULAR DEGENERATION OF LEFT EYE WITH ACTIVE CHOROIDAL NEOVASCULARIZATION: Primary | ICD-10-CM

## 2018-07-31 DIAGNOSIS — H35.052 RETINAL NEOVASCULARIZATION OF LEFT EYE: ICD-10-CM

## 2018-07-31 PROCEDURE — 92014 COMPRE OPH EXAM EST PT 1/>: CPT | Mod: 25,S$PBB,, | Performed by: OPHTHALMOLOGY

## 2018-07-31 PROCEDURE — 67028 INJECTION EYE DRUG: CPT | Mod: PBBFAC,LT | Performed by: OPHTHALMOLOGY

## 2018-07-31 PROCEDURE — 67028 INJECTION EYE DRUG: CPT | Mod: S$PBB,LT,, | Performed by: OPHTHALMOLOGY

## 2018-07-31 PROCEDURE — 92134 CPTRZ OPH DX IMG PST SGM RTA: CPT | Mod: PBBFAC | Performed by: OPHTHALMOLOGY

## 2018-07-31 RX ADMIN — AFLIBERCEPT 2 MG: 40 INJECTION, SOLUTION INTRAVITREAL at 11:07

## 2018-07-31 NOTE — PROGRESS NOTES
HPI     14 wk / OCT / Eylea   DLS-04/24/2018 Dr. 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 FVPED OD - s/p Eylea x 13  With subretinal fibrosis - OD, also history of AION.   Increase SRH/ARF and CME OD 10/13  Now stable CME OD - reactive from SR Fibrosis  In combination with AION, potential limited.  Try observation again OD    2. - Wet ARMD w CNV OS  - Pt responding to Avastin; ; However, Pt responds poorly to extend on Avastin.  -- Pt interested in switching to Eylea -  However, cystoid edema likely response to SR fibrosis, rather than sylvia new CME  Will do maintenance therapy OS - but try extension  S/p Eylea OS x24  s/p Avastin #15    6/16/14 - Pt presented with decreased Va OS from 20/40-20/80  No increase in SRF, fibrosis grossly stable, CME improved  Will monitor closely    7/14/14 - Va OS returned to normal over last month    5/15 - Worsened OS   Resumed q 4 week tx OS  WIll need tighter maintenance - will try alternating therapy     5/16 - stable resume 3 month maintenance  4/18 - stable on current regiment, try slow extension  7/18 - increased SRF OS    Eylea OS    Resume shorter interval, then extend as needed to 10-12 weeks maintenance    -Patient visited [facility] and has magnifying glasses that help       3. H/o AION OD    4. NS OD  PCIOL OS - great result     5. Glc suspect - good IOP    Resume extension    6 weeks OCT      Risks, benefits, and alternatives to treatment discussed in detail with the patient.  The patient voiced understanding and wished to proceed with the procedure    Injection Procedure Note:  Diagnosis: Wet AMD OS    Topical Proparacaine and Betadine.  Inject Eylea OS at 6:00 @ 3.5-4mm posterior to limbus  Post Operative Dx: Same  Complications: None  Follow up as  above.

## 2018-07-31 NOTE — PATIENT INSTRUCTIONS
POST INTRAVITREAL INJECTION PATIENT INSTRUCTIONS   Below are some guidelines for what to expect after your treatment and additional care instructions.   * you may experience mild discomfort in your eye after the treatment. Your eye usually feels better within 24 to 48 hours after the procedure.   * you have been given drops that numb the surface of your eye.   DO NOT rub or touch your eye, DO NOT wear contacts until numbness goes away.   * you may experience small spots that appear in your field of vision, these are usually caused by an air bubble or from the medication. It taked a few hours or days for these to go away.   * use of sunglasses will help reduce light sensitivity and glare.   * DO NOT swim or put sink water ( tap water ) or swin for at least 24 hours after the injection   * If you have a gritty, burning, irritating or stinging feeling in the injected eye. This may be a result of the antiseptic used. Use artifical tears or eye lubricant ( from over- the- counter from your local pharmacy ). If you have some at home already please check the expiration date, so not to use anything contaminated in your eye. A cool pack over your eye brow above the injected eye may also relieve discomfort.   Call us right away or go to the Emergency Department if you have a dramatic decrease in vision or extreme pain in the eye.   [facility] [phone]

## 2018-09-11 ENCOUNTER — TELEPHONE (OUTPATIENT)
Dept: OPHTHALMOLOGY | Facility: CLINIC | Age: 73
End: 2018-09-11

## 2018-10-02 ENCOUNTER — PROCEDURE VISIT (OUTPATIENT)
Dept: OPHTHALMOLOGY | Facility: CLINIC | Age: 73
End: 2018-10-02
Attending: OPHTHALMOLOGY
Payer: MEDICARE

## 2018-10-02 VITALS — DIASTOLIC BLOOD PRESSURE: 73 MMHG | SYSTOLIC BLOOD PRESSURE: 128 MMHG | HEART RATE: 58 BPM

## 2018-10-02 DIAGNOSIS — H35.3212 EXUDATIVE AGE-RELATED MACULAR DEGENERATION OF RIGHT EYE WITH INACTIVE CHOROIDAL NEOVASCULARIZATION: ICD-10-CM

## 2018-10-02 DIAGNOSIS — H35.3221 EXUDATIVE AGE-RELATED MACULAR DEGENERATION OF LEFT EYE WITH ACTIVE CHOROIDAL NEOVASCULARIZATION: Primary | ICD-10-CM

## 2018-10-02 DIAGNOSIS — H35.723 RETINAL PIGMENT EPITHELIAL DETACHMENT OF BOTH EYES: ICD-10-CM

## 2018-10-02 PROCEDURE — 92014 COMPRE OPH EXAM EST PT 1/>: CPT | Mod: 25,S$PBB,, | Performed by: OPHTHALMOLOGY

## 2018-10-02 PROCEDURE — 67028 INJECTION EYE DRUG: CPT | Mod: PBBFAC,LT | Performed by: OPHTHALMOLOGY

## 2018-10-02 PROCEDURE — 67028 INJECTION EYE DRUG: CPT | Mod: S$PBB,LT,, | Performed by: OPHTHALMOLOGY

## 2018-10-02 PROCEDURE — 92134 CPTRZ OPH DX IMG PST SGM RTA: CPT | Mod: PBBFAC | Performed by: OPHTHALMOLOGY

## 2018-10-02 RX ADMIN — AFLIBERCEPT 2 MG: 40 INJECTION, SOLUTION INTRAVITREAL at 09:10

## 2018-10-02 NOTE — PROGRESS NOTES
HPI     Dr. Mica MD  DLS 07/31/18      72 Y/O M here today for Wet AMD OS ck after Eylea injection. 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 FVPED OD - s/p Eylea x 13  With subretinal fibrosis - OD, also history of AION.   Increase SRH/ARF and CME OD 10/13  Now stable CME OD - reactive from SR Fibrosis  In combination with AION, potential limited.  Try observation again OD    2. - Wet ARMD w CNV OS  - Pt responding to Avastin; ; However, Pt responds poorly to extend on Avastin.  -- Pt interested in switching to Eylea -  However, cystoid edema likely response to SR fibrosis, rather than sylvia new CME  Will do maintenance therapy OS - but try extension  S/p Eylea OS x25  s/p Avastin #15    6/16/14 - Pt presented with decreased Va OS from 20/40-20/80  No increase in SRF, fibrosis grossly stable, CME improved  Will monitor closely    7/14/14 - Va OS returned to normal over last month    5/15 - Worsened OS   Resumed q 4 week tx OS  WIll need tighter maintenance - will try alternating therapy     5/16 - stable resume 3 month maintenance  4/18 - stable on current regiment, try slow extension  7/18 - increased SRF OS    Eylea OS    Resume shorter interval, then extend as needed to 10-12 weeks maintenance    -Patient visited [facility] and has magnifying glasses that help       3. H/o AION OD    4. NS OD  PCIOL OS - great result     5. Glc suspect - good IOP    Resume extension next visit    6 weeks OCT      Risks, benefits, and alternatives to treatment discussed in detail with the patient.  The patient voiced understanding and wished to proceed with the procedure    Injection Procedure Note:  Diagnosis: Wet AMD OS    Topical Proparacaine and Betadine.  Inject Eylea OS at 6:00 @ 3.5-4mm posterior to limbus  Post Operative Dx: Same  Complications: None  Follow up as above.

## 2018-11-29 ENCOUNTER — PROCEDURE VISIT (OUTPATIENT)
Dept: OPHTHALMOLOGY | Facility: CLINIC | Age: 73
End: 2018-11-29
Attending: OPHTHALMOLOGY
Payer: MEDICARE

## 2018-11-29 VITALS — DIASTOLIC BLOOD PRESSURE: 71 MMHG | HEART RATE: 63 BPM | SYSTOLIC BLOOD PRESSURE: 122 MMHG

## 2018-11-29 DIAGNOSIS — H35.3212 EXUDATIVE AGE-RELATED MACULAR DEGENERATION OF RIGHT EYE WITH INACTIVE CHOROIDAL NEOVASCULARIZATION: ICD-10-CM

## 2018-11-29 DIAGNOSIS — H35.3221 EXUDATIVE AGE-RELATED MACULAR DEGENERATION OF LEFT EYE WITH ACTIVE CHOROIDAL NEOVASCULARIZATION: Primary | ICD-10-CM

## 2018-11-29 PROCEDURE — 92014 COMPRE OPH EXAM EST PT 1/>: CPT | Mod: 25,S$PBB,, | Performed by: OPHTHALMOLOGY

## 2018-11-29 PROCEDURE — 67028 INJECTION EYE DRUG: CPT | Mod: PBBFAC,PO,LT | Performed by: OPHTHALMOLOGY

## 2018-11-29 PROCEDURE — 92134 CPTRZ OPH DX IMG PST SGM RTA: CPT | Mod: PBBFAC,PO | Performed by: OPHTHALMOLOGY

## 2018-11-29 PROCEDURE — 67028 INJECTION EYE DRUG: CPT | Mod: S$PBB,LT,, | Performed by: OPHTHALMOLOGY

## 2018-11-29 RX ADMIN — AFLIBERCEPT 2 MG: 40 INJECTION, SOLUTION INTRAVITREAL at 03:11

## 2018-11-29 NOTE — PROGRESS NOTES
HPI     Macular Degeneration      Additional comments: 2 mon chk              Comments       OCT - FVPED OD with noncentral SRF  OS - improved SRF, stable fibrosis OS      A/P    1. FVPED OD - s/p Eylea x 13  With subretinal fibrosis - OD, also history of AION.   Increase SRH/ARF and CME OD 10/13  Now stable CME OD - reactive from SR Fibrosis  In combination with AION, potential limited.  Try observation again OD    2. - Wet ARMD w CNV OS  - Pt responding to Avastin; ; However, Pt responds poorly to extend on Avastin.  -- Pt interested in switching to Eylea -  However, cystoid edema likely response to SR fibrosis, rather than sylvia new CME  Will do maintenance therapy OS - but try extension  S/p Eylea OS x25  s/p Avastin #15    6/16/14 - Pt presented with decreased Va OS from 20/40-20/80  No increase in SRF, fibrosis grossly stable, CME improved  Will monitor closely    7/14/14 - Va OS returned to normal over last month    5/15 - Worsened OS   Resumed q 4 week tx OS  WIll need tighter maintenance - will try alternating therapy     5/16 - stable resume 3 month maintenance  4/18 - stable on current regiment, try slow extension  7/18 - increased SRF OS    Eylea OS    Resume shorter interval, then extend as needed to 10-12 weeks maintenance    -Patient visited [facility] and has magnifying glasses that help       3. H/o AION OD    4. NS OD  PCIOL OS - great result     5. Glc suspect - good IOP    Resume extension next visit    9 weeks OCT      Risks, benefits, and alternatives to treatment discussed in detail with the patient.  The patient voiced understanding and wished to proceed with the procedure    Injection Procedure Note:  Diagnosis: Wet AMD OS    Patient Identified and Time Out complete  Topical Proparacaine and Betadine.  Inject Eylea OS at 6:00 @ 3.5-4mm posterior to limbus  Post Operative Dx: Same  Complications: None  Follow up as above.

## 2019-01-25 ENCOUNTER — PATIENT MESSAGE (OUTPATIENT)
Dept: CARDIOLOGY | Facility: CLINIC | Age: 74
End: 2019-01-25

## 2019-01-25 DIAGNOSIS — R94.39 ABNORMAL STRESS TEST: ICD-10-CM

## 2019-01-25 DIAGNOSIS — E78.5 HYPERLIPIDEMIA, UNSPECIFIED HYPERLIPIDEMIA TYPE: ICD-10-CM

## 2019-01-25 DIAGNOSIS — I10 ESSENTIAL HYPERTENSION: ICD-10-CM

## 2019-01-25 RX ORDER — ENALAPRIL MALEATE 20 MG/1
20 TABLET ORAL DAILY
Qty: 30 TABLET | Refills: 0 | Status: SHIPPED | OUTPATIENT
Start: 2019-01-25

## 2019-01-25 RX ORDER — HYDROCHLOROTHIAZIDE 25 MG/1
25 TABLET ORAL DAILY
Qty: 30 TABLET | Refills: 0 | Status: SHIPPED | OUTPATIENT
Start: 2019-01-25

## 2019-01-25 RX ORDER — CARVEDILOL 12.5 MG/1
12.5 TABLET ORAL 2 TIMES DAILY WITH MEALS
Qty: 60 TABLET | Refills: 0 | Status: SHIPPED | OUTPATIENT
Start: 2019-01-25 | End: 2023-07-21

## 2019-02-12 ENCOUNTER — PROCEDURE VISIT (OUTPATIENT)
Dept: OPHTHALMOLOGY | Facility: CLINIC | Age: 74
End: 2019-02-12
Attending: OPHTHALMOLOGY
Payer: MEDICARE

## 2019-02-12 VITALS — HEART RATE: 85 BPM | SYSTOLIC BLOOD PRESSURE: 109 MMHG | DIASTOLIC BLOOD PRESSURE: 74 MMHG

## 2019-02-12 DIAGNOSIS — H35.3221 EXUDATIVE AGE-RELATED MACULAR DEGENERATION OF LEFT EYE WITH ACTIVE CHOROIDAL NEOVASCULARIZATION: Primary | ICD-10-CM

## 2019-02-12 DIAGNOSIS — H35.3222 EXUDATIVE AGE-RELATED MACULAR DEGENERATION OF LEFT EYE WITH INACTIVE CHOROIDAL NEOVASCULARIZATION: ICD-10-CM

## 2019-02-12 DIAGNOSIS — H35.3212 EXUDATIVE AGE-RELATED MACULAR DEGENERATION OF RIGHT EYE WITH INACTIVE CHOROIDAL NEOVASCULARIZATION: ICD-10-CM

## 2019-02-12 PROCEDURE — 67028 INJECTION EYE DRUG: CPT | Mod: S$PBB,LT,, | Performed by: OPHTHALMOLOGY

## 2019-02-12 PROCEDURE — 92134 POSTERIOR SEGMENT OCT RETINA (OCULAR COHERENCE TOMOGRAPHY)-BOTH EYES: ICD-10-PCS | Mod: 26,S$PBB,, | Performed by: OPHTHALMOLOGY

## 2019-02-12 PROCEDURE — 92014 PR EYE EXAM, EST PATIENT,COMPREHESV: ICD-10-PCS | Mod: 25,S$PBB,, | Performed by: OPHTHALMOLOGY

## 2019-02-12 PROCEDURE — 67028 INJECTION EYE DRUG: CPT | Mod: PBBFAC,LT | Performed by: OPHTHALMOLOGY

## 2019-02-12 PROCEDURE — 67028 PR INJECT INTRAVITREAL PHARMCOLOGIC: ICD-10-PCS | Mod: S$PBB,LT,, | Performed by: OPHTHALMOLOGY

## 2019-02-12 PROCEDURE — 92134 CPTRZ OPH DX IMG PST SGM RTA: CPT | Mod: PBBFAC | Performed by: OPHTHALMOLOGY

## 2019-02-12 PROCEDURE — 92014 COMPRE OPH EXAM EST PT 1/>: CPT | Mod: 25,S$PBB,, | Performed by: OPHTHALMOLOGY

## 2019-02-12 RX ADMIN — AFLIBERCEPT 2 MG: 40 INJECTION, SOLUTION INTRAVITREAL at 12:02

## 2019-02-12 NOTE — PROGRESS NOTES
HPI     dls 11/29/18     +decrease in vision distance and near since December  -no pain  +floaters ou- always  -no flashes    -no drops         OCT - FVPED OD with noncentral SRF  OS - improved SRF, stable fibrosis OS  Increased [place] OS    A/P    1. FVPED OD - s/p Eylea x 13  With subretinal fibrosis - OD, also history of AION.   Increase SRH/ARF and CME OD 10/13  Now stable CME OD - reactive from SR Fibrosis  In combination with AION, potential limited.  Try observation again OD    2. - Wet ARMD w CNV OS  - Pt responding to Avastin; ; However, Pt responds poorly to extend on Avastin.  -- Pt interested in switching to Eylea -  However, cystoid edema likely response to SR fibrosis, rather than sylvia new CME  Will do maintenance therapy OS - but try extension  S/p Eylea OS x25  s/p Avastin #15    6/16/14 - Pt presented with decreased Va OS from 20/40-20/80  No increase in SRF, fibrosis grossly stable, CME improved  Will monitor closely    7/14/14 - Va OS returned to normal over last month    5/15 - Worsened OS   Resumed q 4 week tx OS  WIll need tighter maintenance - will try alternating therapy     5/16 - stable resume 3 month maintenance  4/18 - stable on current regiment, try slow extension  7/18 - increased SRF OS    Eylea OS    Resume shorter interval    -Patient visited [employer] and has magnifying glasses that help       3. H/o AION OD    4. NS OD  PCIOL OS - great result     5. 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  The patient voiced understanding and wished to proceed with the procedure    Injection Procedure Note:  Diagnosis: Wet AMD OS    Patient Identified and Time Out complete  Topical Proparacaine and Betadine.  Inject Eylea OS at 6:00 @ 3.5-4mm posterior to limbus  Post Operative Dx: Same  Complications: None  Follow up as above.

## 2019-02-12 NOTE — PATIENT INSTRUCTIONS

## 2019-04-09 ENCOUNTER — PROCEDURE VISIT (OUTPATIENT)
Dept: OPHTHALMOLOGY | Facility: CLINIC | Age: 74
End: 2019-04-09
Payer: MEDICARE

## 2019-04-09 VITALS — SYSTOLIC BLOOD PRESSURE: 90 MMHG | HEART RATE: 82 BPM | DIASTOLIC BLOOD PRESSURE: 60 MMHG

## 2019-04-09 DIAGNOSIS — H35.3212 EXUDATIVE AGE-RELATED MACULAR DEGENERATION OF RIGHT EYE WITH INACTIVE CHOROIDAL NEOVASCULARIZATION: ICD-10-CM

## 2019-04-09 DIAGNOSIS — H35.3221 EXUDATIVE AGE-RELATED MACULAR DEGENERATION OF LEFT EYE WITH ACTIVE CHOROIDAL NEOVASCULARIZATION: Primary | ICD-10-CM

## 2019-04-09 DIAGNOSIS — H35.723 RETINAL PIGMENT EPITHELIAL DETACHMENT OF BOTH EYES: ICD-10-CM

## 2019-04-09 PROCEDURE — 92014 PR EYE EXAM, EST PATIENT,COMPREHESV: ICD-10-PCS | Mod: 25,S$PBB,, | Performed by: OPHTHALMOLOGY

## 2019-04-09 PROCEDURE — 67028 INJECTION EYE DRUG: CPT | Mod: S$PBB,LT,, | Performed by: OPHTHALMOLOGY

## 2019-04-09 PROCEDURE — 92014 COMPRE OPH EXAM EST PT 1/>: CPT | Mod: 25,S$PBB,, | Performed by: OPHTHALMOLOGY

## 2019-04-09 PROCEDURE — 67028 INJECTION EYE DRUG: CPT | Mod: PBBFAC,LT | Performed by: OPHTHALMOLOGY

## 2019-04-09 PROCEDURE — 67028 PR INJECT INTRAVITREAL PHARMCOLOGIC: ICD-10-PCS | Mod: S$PBB,LT,, | Performed by: OPHTHALMOLOGY

## 2019-04-09 RX ADMIN — AFLIBERCEPT 2 MG: 40 INJECTION, SOLUTION INTRAVITREAL at 11:04

## 2019-04-09 NOTE — PROGRESS NOTES
HPI     8 wk / OCT / Eylea   DLS- 02/12/2019 Dr. Nino     Pt sts no change in va since last denies pain     (-)Flashes (+)Floaters  (-)Photophobia needs more light to see better  (-)Glare    No gtts        OCT - FVPED OD with noncentral SRF  OS - improved SRF, stable fibrosis OS  Increased [place] OS    A/P    1. FVPED OD - s/p Eylea x 13  With subretinal fibrosis - OD, also history of AION.   Increase SRH/ARF and CME OD 10/13  Now stable CME OD - reactive from SR Fibrosis  In combination with AION, potential limited.  Try observation again OD    2. - Wet ARMD w CNV OS  - Pt responding to Avastin; ; However, Pt responds poorly to extend on Avastin.  -- Pt interested in switching to Eylea -  However, cystoid edema likely response to SR fibrosis, rather than sylvia new CME  Will do maintenance therapy OS - but try extension  S/p Eylea OS x26  s/p Avastin #15    6/16/14 - Pt presented with decreased Va OS from 20/40-20/80  No increase in SRF, fibrosis grossly stable, CME improved  Will monitor closely    7/14/14 - Va OS returned to normal over last month    5/15 - Worsened OS   Resumed q 4 week tx OS  WIll need tighter maintenance - will try alternating therapy     5/16 - stable resume 3 month maintenance  4/18 - stable on current regiment, try slow extension  7/18 - increased SRF OS    Eylea OS    Resume shorter interval    -Patient visited [facility] and has magnifying glasses that help       3. H/o AION OD    4. NS OD  PCIOL OS - great result     5. 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  The patient voiced understanding and wished to proceed with the procedure    Injection Procedure Note:  Diagnosis: Wet AMD OS    Patient Identified and Time Out complete  Topical Proparacaine and Betadine.  Inject Eylea OS at 6:00 @ 3.5-4mm posterior to limbus  Post Operative Dx: Same  Complications:  None  Follow up as above.

## 2019-04-09 NOTE — PATIENT INSTRUCTIONS

## 2019-06-04 ENCOUNTER — PROCEDURE VISIT (OUTPATIENT)
Dept: OPHTHALMOLOGY | Facility: CLINIC | Age: 74
End: 2019-06-04
Payer: MEDICARE

## 2019-06-04 VITALS — DIASTOLIC BLOOD PRESSURE: 62 MMHG | HEART RATE: 66 BPM | SYSTOLIC BLOOD PRESSURE: 98 MMHG

## 2019-06-04 DIAGNOSIS — H35.3212 EXUDATIVE AGE-RELATED MACULAR DEGENERATION OF RIGHT EYE WITH INACTIVE CHOROIDAL NEOVASCULARIZATION: ICD-10-CM

## 2019-06-04 DIAGNOSIS — H35.353 CYSTOID MACULAR EDEMA OF BOTH EYES: ICD-10-CM

## 2019-06-04 DIAGNOSIS — H35.3221 EXUDATIVE AGE-RELATED MACULAR DEGENERATION OF LEFT EYE WITH ACTIVE CHOROIDAL NEOVASCULARIZATION: Primary | ICD-10-CM

## 2019-06-04 PROCEDURE — 67028 INJECTION EYE DRUG: CPT | Mod: PBBFAC,LT | Performed by: OPHTHALMOLOGY

## 2019-06-04 PROCEDURE — 92014 PR EYE EXAM, EST PATIENT,COMPREHESV: ICD-10-PCS | Mod: 25,S$PBB,, | Performed by: OPHTHALMOLOGY

## 2019-06-04 PROCEDURE — 67028 PR INJECT INTRAVITREAL PHARMCOLOGIC: ICD-10-PCS | Mod: S$PBB,LT,, | Performed by: OPHTHALMOLOGY

## 2019-06-04 PROCEDURE — 67028 INJECTION EYE DRUG: CPT | Mod: S$PBB,LT,, | Performed by: OPHTHALMOLOGY

## 2019-06-04 PROCEDURE — 92134 CPTRZ OPH DX IMG PST SGM RTA: CPT | Mod: PBBFAC | Performed by: OPHTHALMOLOGY

## 2019-06-04 PROCEDURE — 92014 COMPRE OPH EXAM EST PT 1/>: CPT | Mod: 25,S$PBB,, | Performed by: OPHTHALMOLOGY

## 2019-06-04 PROCEDURE — 92134 POSTERIOR SEGMENT OCT RETINA (OCULAR COHERENCE TOMOGRAPHY)-BOTH EYES: ICD-10-PCS | Mod: 26,S$PBB,, | Performed by: OPHTHALMOLOGY

## 2019-06-04 RX ADMIN — AFLIBERCEPT 2 MG: 40 INJECTION, SOLUTION INTRAVITREAL at 11:06

## 2019-06-04 NOTE — PROGRESS NOTES
HPI     DLS  04/09/2019  HX Exudative ARMD  C/o Pt feels vision currently stable at present time.    Last edited by Valente Prakash on 6/4/2019  9:21 AM. (History)             OCT - FVPED OD with noncentral SRF  OS - improved SRF, stable fibrosis OS  Increased GA OS      A/P    1. FVPED OD - s/p Eylea x 13  With subretinal fibrosis - OD, also history of AION.   Increase SRH/ARF and CME OD 10/13  Now stable CME OD - reactive from SR Fibrosis  In combination with AION, potential limited.  Try observation again OD    2. - Wet ARMD w CNV OS  - Pt responding to Avastin; ; However, Pt responds poorly to extend on Avastin.  -- Pt interested in switching to Eylea -  However, cystoid edema likely response to SR fibrosis, rather than sylvia new CME  Will do maintenance therapy OS - but try extension  S/p Eylea OS x27  s/p Avastin #15    6/16/14 - Pt presented with decreased Va OS from 20/40-20/80  No increase in SRF, fibrosis grossly stable, CME improved  Will monitor closely    7/14/14 - Va OS returned to normal over last month    5/15 - Worsened OS   Resumed q 4 week tx OS  WIll need tighter maintenance - will try alternating therapy     5/16 - stable resume 3 month maintenance  4/18 - stable on current regiment, try slow extension  7/18 - increased SRF OS  6/19 - try extension again    Eylea OS    Resume shorter interval    -Patient visited [employer] and has magnifying glasses that help       3. H/o AION OD    4. NS OD  PCIOL OS - great result     5. Glc suspect - good IOP      Pt legally blind based on visual acuity      10 weeks OCT      Risks, benefits, and alternatives to treatment discussed in detail with the patient.  The patient voiced understanding and wished to proceed with the procedure    Injection Procedure Note:  Diagnosis: Wet AMD OS    Patient Identified and Time Out complete  Topical Proparacaine and Betadine.  Inject Eylea OS at 6:00 @ 3.5-4mm posterior to limbus  Post Operative Dx: Same  Complications:  None  Follow up as above.

## 2019-08-13 ENCOUNTER — PROCEDURE VISIT (OUTPATIENT)
Dept: OPHTHALMOLOGY | Facility: CLINIC | Age: 74
End: 2019-08-13
Payer: MEDICARE

## 2019-08-13 VITALS — HEART RATE: 58 BPM | DIASTOLIC BLOOD PRESSURE: 53 MMHG | SYSTOLIC BLOOD PRESSURE: 90 MMHG

## 2019-08-13 DIAGNOSIS — H35.3221 EXUDATIVE AGE-RELATED MACULAR DEGENERATION OF LEFT EYE WITH ACTIVE CHOROIDAL NEOVASCULARIZATION: Primary | ICD-10-CM

## 2019-08-13 DIAGNOSIS — H35.723 RETINAL PIGMENT EPITHELIAL DETACHMENT OF BOTH EYES: ICD-10-CM

## 2019-08-13 DIAGNOSIS — H35.3212 EXUDATIVE AGE-RELATED MACULAR DEGENERATION OF RIGHT EYE WITH INACTIVE CHOROIDAL NEOVASCULARIZATION: ICD-10-CM

## 2019-08-13 PROCEDURE — 67028 PR INJECT INTRAVITREAL PHARMCOLOGIC: ICD-10-PCS | Mod: S$PBB,LT,, | Performed by: OPHTHALMOLOGY

## 2019-08-13 PROCEDURE — 92134 POSTERIOR SEGMENT OCT RETINA (OCULAR COHERENCE TOMOGRAPHY)-BOTH EYES: ICD-10-PCS | Mod: 26,S$PBB,, | Performed by: OPHTHALMOLOGY

## 2019-08-13 PROCEDURE — 67028 INJECTION EYE DRUG: CPT | Mod: S$PBB,LT,, | Performed by: OPHTHALMOLOGY

## 2019-08-13 PROCEDURE — 92134 CPTRZ OPH DX IMG PST SGM RTA: CPT | Mod: PBBFAC | Performed by: OPHTHALMOLOGY

## 2019-08-13 PROCEDURE — 92014 COMPRE OPH EXAM EST PT 1/>: CPT | Mod: 25,S$PBB,, | Performed by: OPHTHALMOLOGY

## 2019-08-13 PROCEDURE — 67028 INJECTION EYE DRUG: CPT | Mod: PBBFAC,LT | Performed by: OPHTHALMOLOGY

## 2019-08-13 PROCEDURE — 92014 PR EYE EXAM, EST PATIENT,COMPREHESV: ICD-10-PCS | Mod: 25,S$PBB,, | Performed by: OPHTHALMOLOGY

## 2019-08-13 RX ADMIN — AFLIBERCEPT 2 MG: 40 INJECTION, SOLUTION INTRAVITREAL at 10:08

## 2019-08-13 NOTE — PROGRESS NOTES
HPI     10 wk / OCT / Eylea   DLS- 06/04/2019 Dr. Nino     Pt sts no change in va since last.   Denies pain     (-)Flashes (+)Floaters  (-)Photophobia needs more light to see better  (-)Glare    No gtts            OCT - FVPED OD with noncentral SRF  OS - improved SRF, stable fibrosis OS  Increased GA OS      A/P    1. FVPED OD - s/p Eylea x 13  With subretinal fibrosis - OD, also history of AION.   Increase SRH/ARF and CME OD 10/13  Now stable CME OD - reactive from SR Fibrosis  In combination with AION, potential limited.  Try observation again OD    2. - Wet ARMD w CNV OS  - Pt responding to Avastin; ; However, Pt responds poorly to extend on Avastin.  -- Pt interested in switching to Eylea -  However, cystoid edema likely response to SR fibrosis, rather than sylvia new CME  Will do maintenance therapy OS - but try extension  S/p Eylea OS x28  s/p Avastin #15    6/16/14 - Pt presented with decreased Va OS from 20/40-20/80  No increase in SRF, fibrosis grossly stable, CME improved  Will monitor closely    7/14/14 - Va OS returned to normal over last month    5/15 - Worsened OS   Resumed q 4 week tx OS  WIll need tighter maintenance - will try alternating therapy     5/16 - stable resume 3 month maintenance  4/18 - stable on current regiment, try slow extension  7/18 - increased SRF OS  6/19 - try extension again    Eylea OS    Resume shorter interval    -Patient visited [facility] and has magnifying glasses that help       3. H/o AION OD    4. NS OD  PCIOL OS - great result     5. Glc suspect - good IOP      Pt legally blind based on visual acuity      10 weeks OCT      Risks, benefits, and alternatives to treatment discussed in detail with the patient.  The patient voiced understanding and wished to proceed with the procedure    Injection Procedure Note:  Diagnosis: Wet AMD OS    Patient Identified and Time Out complete  Topical Proparacaine and Betadine.  Inject Eylea OS at 6:00 @ 3.5-4mm posterior to  limbus  Post Operative Dx: Same  Complications: None  Follow up as above.

## 2019-10-22 ENCOUNTER — PROCEDURE VISIT (OUTPATIENT)
Dept: OPHTHALMOLOGY | Facility: CLINIC | Age: 74
End: 2019-10-22
Payer: MEDICARE

## 2019-10-22 VITALS — HEART RATE: 57 BPM | SYSTOLIC BLOOD PRESSURE: 118 MMHG | DIASTOLIC BLOOD PRESSURE: 69 MMHG

## 2019-10-22 DIAGNOSIS — H35.723 RETINAL PIGMENT EPITHELIAL DETACHMENT OF BOTH EYES: ICD-10-CM

## 2019-10-22 DIAGNOSIS — H35.3212 EXUDATIVE AGE-RELATED MACULAR DEGENERATION OF RIGHT EYE WITH INACTIVE CHOROIDAL NEOVASCULARIZATION: ICD-10-CM

## 2019-10-22 DIAGNOSIS — H35.3221 EXUDATIVE AGE-RELATED MACULAR DEGENERATION OF LEFT EYE WITH ACTIVE CHOROIDAL NEOVASCULARIZATION: Primary | ICD-10-CM

## 2019-10-22 PROCEDURE — 92014 COMPRE OPH EXAM EST PT 1/>: CPT | Mod: 25,S$PBB,, | Performed by: OPHTHALMOLOGY

## 2019-10-22 PROCEDURE — 92014 PR EYE EXAM, EST PATIENT,COMPREHESV: ICD-10-PCS | Mod: 25,S$PBB,, | Performed by: OPHTHALMOLOGY

## 2019-10-22 PROCEDURE — 67028 PR INJECT INTRAVITREAL PHARMCOLOGIC: ICD-10-PCS | Mod: S$PBB,LT,, | Performed by: OPHTHALMOLOGY

## 2019-10-22 PROCEDURE — 67028 INJECTION EYE DRUG: CPT | Mod: PBBFAC,LT | Performed by: OPHTHALMOLOGY

## 2019-10-22 PROCEDURE — 92134 CPTRZ OPH DX IMG PST SGM RTA: CPT | Mod: PBBFAC | Performed by: OPHTHALMOLOGY

## 2019-10-22 PROCEDURE — 67028 INJECTION EYE DRUG: CPT | Mod: S$PBB,LT,, | Performed by: OPHTHALMOLOGY

## 2019-10-22 PROCEDURE — 92134 POSTERIOR SEGMENT OCT RETINA (OCULAR COHERENCE TOMOGRAPHY)-BOTH EYES: ICD-10-PCS | Mod: 26,S$PBB,, | Performed by: OPHTHALMOLOGY

## 2019-10-22 RX ADMIN — AFLIBERCEPT 2 MG: 40 INJECTION, SOLUTION INTRAVITREAL at 09:10

## 2019-10-22 NOTE — PATIENT INSTRUCTIONS
.  Aflibercept solution for injection  What is this medicine?  AFLIBERCEPT (a FLIB er sept) is an injectable medicine for the eye. It is used to treat wet age-related macular degeneration (AMD) and macular edema, including diabetic macular edema. This drug is also used to treat diabetic retinopathy in patients with diabetic macular edema. This medicine helps to slow the disease and may help to maintain vision. It is not a cure.  How should I use this medicine?  This medicine is injected into the eye by an eye doctor who specializes in this treatment.  Talk to your pediatrician regarding the use of this medicine in children. Special care may be needed.  What side effects may I notice from receiving this medicine?  Side effects that you should report to your doctor or health care professional as soon as possible:  · allergic reactions like skin rash, itching or hives, swelling of the face, lips, or tongue  · bleeding in eye  · changes in vision  · confusion, trouble speaking or understanding  · eye pain  · sensitivity to light  · severe headaches  · sudden numbness or weakness of the face, arm or leg  · swelling or redness of the eye or eyelid  · trouble walking, dizziness, loss of balance or coordination  Side effects that usually do not require medical attention (Report these to your doctor or health care professional if they continue or are bothersome.):  · feeling of something in the eye  · increased tearing  What may interact with this medicine?  Interactions are not expected. Do not use any other eye products without talking to your doctor or health care professional.  What if I miss a dose?  It is important not to miss your dose. Call your doctor or health care professional if you are unable to keep an appointment.  Where should I keep my medicine?  This drug is given in a hospital or clinic and will not be stored at home.  What should I tell my health care provider before I take this medicine?  They need to  know if you have any of these conditions:  · eye infection  · eye inflammation  · glaucoma  · recent eye surgery  · an unusual or allergic reaction to aflibercept, other medicines, foods, dyes, or preservatives  · pregnant or trying to get pregnant  · breast-feeding  What should I watch for while using this medicine?  Your condition will be monitored carefully while you are receiving this medicine.  If you are a woman, use effective birth control before your first dose, during your treatment, and for at least 3 months after your last dose.  Tell your doctor or health care professional right away if you have any change in your eyesight. This medicine may cause temporary blurred vision. Do not drive or use machinery until your vision has returned to normal.  NOTE:This sheet is a summary. It may not cover all possible information. If you have questions about this medicine, talk to your doctor, pharmacist, or health care provider. Copyright© 2017 [employer]

## 2019-10-22 NOTE — PROGRESS NOTES
HPI     DLS 8/13/19     10 weeks ck          OCT - FVPED OD with noncentral SRF  OS - improved SRF, stable fibrosis OS  Increased GA OS      A/P    1. FVPED OD - s/p Eylea x 13  With subretinal fibrosis - OD, also history of AION.   Increase SRH/ARF and CME OD 10/13  Now stable CME OD - reactive from SR Fibrosis  In combination with AION, potential limited.  Try observation again OD    2. - Wet ARMD w CNV OS  - Pt responding to Avastin; ; However, Pt responds poorly to extend on Avastin.  -- Pt interested in switching to Eylea -  However, cystoid edema likely response to SR fibrosis, rather than sylvia new CME  Will do maintenance therapy OS - but try extension  S/p Eylea OS x29  s/p Avastin #15    6/16/14 - Pt presented with decreased Va OS from 20/40-20/80  No increase in SRF, fibrosis grossly stable, CME improved  Will monitor closely    7/14/14 - Va OS returned to normal over last month    5/15 - Worsened OS   Resumed q 4 week tx OS  WIll need tighter maintenance - will try alternating therapy     5/16 - stable resume 3 month maintenance  4/18 - stable on current regiment, try slow extension  7/18 - increased SRF OS  6/19 - try extension again    Eylea OS    Resume shorter interval    -Patient visited [employer] and has magnifying glasses that help       3. H/o AION OD    4. NS OD  PCIOL OS - great result     5. Glc suspect - good IOP      Pt legally blind based on visual acuity      10 weeks OCT      Risks, benefits, and alternatives to treatment discussed in detail with the patient.  The patient voiced understanding and wished to proceed with the procedure    Injection Procedure Note:  Diagnosis: Wet AMD OS    Patient Identified and Time Out complete  Topical Proparacaine and Betadine.  Inject Eylea OS at 6:00 @ 3.5-4mm posterior to limbus  Post Operative Dx: Same  Complications: None  Follow up as above.

## 2020-01-24 ENCOUNTER — PROCEDURE VISIT (OUTPATIENT)
Dept: OPHTHALMOLOGY | Facility: CLINIC | Age: 75
End: 2020-01-24
Attending: OPHTHALMOLOGY
Payer: MEDICARE

## 2020-01-24 VITALS — DIASTOLIC BLOOD PRESSURE: 67 MMHG | SYSTOLIC BLOOD PRESSURE: 106 MMHG

## 2020-01-24 DIAGNOSIS — H35.723 RETINAL PIGMENT EPITHELIAL DETACHMENT OF BOTH EYES: ICD-10-CM

## 2020-01-24 DIAGNOSIS — H35.3221 EXUDATIVE AGE-RELATED MACULAR DEGENERATION OF LEFT EYE WITH ACTIVE CHOROIDAL NEOVASCULARIZATION: Primary | ICD-10-CM

## 2020-01-24 DIAGNOSIS — H35.3212 EXUDATIVE AGE-RELATED MACULAR DEGENERATION OF RIGHT EYE WITH INACTIVE CHOROIDAL NEOVASCULARIZATION: ICD-10-CM

## 2020-01-24 PROCEDURE — 92014 COMPRE OPH EXAM EST PT 1/>: CPT | Mod: 25,S$PBB,, | Performed by: OPHTHALMOLOGY

## 2020-01-24 PROCEDURE — 92134 POSTERIOR SEGMENT OCT RETINA (OCULAR COHERENCE TOMOGRAPHY)-BOTH EYES: ICD-10-PCS | Mod: 26,S$PBB,, | Performed by: OPHTHALMOLOGY

## 2020-01-24 PROCEDURE — 67028 PR INJECT INTRAVITREAL PHARMCOLOGIC: ICD-10-PCS | Mod: S$PBB,LT,, | Performed by: OPHTHALMOLOGY

## 2020-01-24 PROCEDURE — 92134 CPTRZ OPH DX IMG PST SGM RTA: CPT | Mod: PBBFAC | Performed by: OPHTHALMOLOGY

## 2020-01-24 PROCEDURE — 67028 INJECTION EYE DRUG: CPT | Mod: PBBFAC,LT | Performed by: OPHTHALMOLOGY

## 2020-01-24 PROCEDURE — 67028 INJECTION EYE DRUG: CPT | Mod: S$PBB,LT,, | Performed by: OPHTHALMOLOGY

## 2020-01-24 PROCEDURE — 92014 PR EYE EXAM, EST PATIENT,COMPREHESV: ICD-10-PCS | Mod: 25,S$PBB,, | Performed by: OPHTHALMOLOGY

## 2020-01-24 RX ADMIN — AFLIBERCEPT 2 MG: 40 INJECTION, SOLUTION INTRAVITREAL at 09:01

## 2020-01-24 NOTE — PROGRESS NOTES
HPI     DLS 10/22/19     10 weeks ck     Pt has noticed some decline in vision         OCT - FVPED OD with noncentral SRF  OS - increased, stable fibrosis OS  Increased GA OS      A/P    1. FVPED OD - s/p Eylea x 13  With subretinal fibrosis - OD, also history of AION.   Increase SRH/ARF and CME OD 10/13  Now stable CME OD - reactive from SR Fibrosis  In combination with AION, potential limited.  Try observation again OD    2. - Wet ARMD w CNV OS  - Pt responding to Avastin; ; However, Pt responds poorly to extend on Avastin.  -- Pt interested in switching to Eylea -  However, cystoid edema likely response to SR fibrosis, rather than sylvia new CME  Will do maintenance therapy OS - but try extension  S/p Eylea OS x29  s/p Avastin #15    6/16/14 - Pt presented with decreased Va OS from 20/40-20/80  No increase in SRF, fibrosis grossly stable, CME improved  Will monitor closely    7/14/14 - Va OS returned to normal over last month    5/15 - Worsened OS   Resumed q 4 week tx OS  WIll need tighter maintenance - will try alternating therapy     5/16 - stable resume 3 month maintenance  4/18 - stable on current regiment, try slow extension  7/18 - increased SRF OS  6/19 - try extension again  1/20 -= increased SRF at 12 weeks    Eylea OS    Resume shorter interval    -Patient visited [employer] and has magnifying glasses that help       3. H/o AION OD    4. NS OD  PCIOL OS - great result  Increased PCO with glare     5. Glc suspect - good IOP      Pt legally blind based on visual acuity      6 weeks OCT      Risks, benefits, and alternatives to treatment discussed in detail with the patient.  The patient voiced understanding and wished to proceed with the procedure    Injection Procedure Note:  Diagnosis: Wet AMD OS    Patient Identified and Time Out complete  Topical Proparacaine and Betadine.  Inject Eylea OS at 6:00 @ 3.5-4mm posterior to limbus  Post Operative Dx: Same  Complications: None  Follow up as above.

## 2020-01-24 NOTE — PATIENT INSTRUCTIONS

## 2020-03-10 ENCOUNTER — PROCEDURE VISIT (OUTPATIENT)
Dept: OPHTHALMOLOGY | Facility: CLINIC | Age: 75
End: 2020-03-10
Payer: MEDICARE

## 2020-03-10 VITALS — SYSTOLIC BLOOD PRESSURE: 100 MMHG | HEART RATE: 75 BPM | DIASTOLIC BLOOD PRESSURE: 59 MMHG

## 2020-03-10 DIAGNOSIS — H35.3212 EXUDATIVE AGE-RELATED MACULAR DEGENERATION OF RIGHT EYE WITH INACTIVE CHOROIDAL NEOVASCULARIZATION: ICD-10-CM

## 2020-03-10 DIAGNOSIS — H35.3221 EXUDATIVE AGE-RELATED MACULAR DEGENERATION OF LEFT EYE WITH ACTIVE CHOROIDAL NEOVASCULARIZATION: Primary | ICD-10-CM

## 2020-03-10 DIAGNOSIS — H35.353 CYSTOID MACULAR EDEMA OF BOTH EYES: ICD-10-CM

## 2020-03-10 PROCEDURE — 67028 PR INJECT INTRAVITREAL PHARMCOLOGIC: ICD-10-PCS | Mod: S$PBB,LT,, | Performed by: OPHTHALMOLOGY

## 2020-03-10 PROCEDURE — 92134 POSTERIOR SEGMENT OCT RETINA (OCULAR COHERENCE TOMOGRAPHY)-BOTH EYES: ICD-10-PCS | Mod: 26,S$PBB,, | Performed by: OPHTHALMOLOGY

## 2020-03-10 PROCEDURE — 92014 PR EYE EXAM, EST PATIENT,COMPREHESV: ICD-10-PCS | Mod: 25,S$PBB,, | Performed by: OPHTHALMOLOGY

## 2020-03-10 PROCEDURE — 92134 CPTRZ OPH DX IMG PST SGM RTA: CPT | Mod: PBBFAC | Performed by: OPHTHALMOLOGY

## 2020-03-10 PROCEDURE — 67028 INJECTION EYE DRUG: CPT | Mod: PBBFAC,LT | Performed by: OPHTHALMOLOGY

## 2020-03-10 PROCEDURE — 67028 INJECTION EYE DRUG: CPT | Mod: S$PBB,LT,, | Performed by: OPHTHALMOLOGY

## 2020-03-10 PROCEDURE — 92014 COMPRE OPH EXAM EST PT 1/>: CPT | Mod: 25,S$PBB,, | Performed by: OPHTHALMOLOGY

## 2020-03-10 RX ORDER — ROSUVASTATIN CALCIUM 10 MG/1
10 TABLET, COATED ORAL DAILY
COMMUNITY
Start: 2020-02-17

## 2020-03-10 RX ADMIN — AFLIBERCEPT 2 MG: 40 INJECTION, SOLUTION INTRAVITREAL at 09:03

## 2020-03-10 NOTE — PROGRESS NOTES
HPI     Macular Degeneration      Additional comments: 6 wk amd chk              Comments         OCT - FVPED OD with noncentral SRF  OS - increased, stable fibrosis OS  Increased GA OS      A/P    1. FVPED OD - s/p Eylea x 13  With subretinal fibrosis - OD, also history of AION.   Increase SRH/ARF and CME OD 10/13  Now stable CME OD - reactive from SR Fibrosis  In combination with AION, potential limited.  Try observation again OD    2. - Wet ARMD w CNV OS  - Pt responding to Avastin; ; However, Pt responds poorly to extend on Avastin.  -- Pt interested in switching to Eylea -  However, cystoid edema likely response to SR fibrosis, rather than sylvia new CME  Will do maintenance therapy OS - but try extension  S/p Eylea OS x29  s/p Avastin #15    6/16/14 - Pt presented with decreased Va OS from 20/40-20/80  No increase in SRF, fibrosis grossly stable, CME improved  Will monitor closely    7/14/14 - Va OS returned to normal over last month    5/15 - Worsened OS   Resumed q 4 week tx OS  WIll need tighter maintenance - will try alternating therapy     5/16 - stable resume 3 month maintenance  4/18 - stable on current regiment, try slow extension  7/18 - increased SRF OS  6/19 - try extension again  1/20 -= increased SRF at 12 weeks    Eylea OS    Resume shorter interval    -Patient visited [employer] and has magnifying glasses that help       3. H/o AION OD    4. NS OD  PCIOL OS - great result  Increased PCO with glare     5. Glc suspect - good IOP      Pt legally blind based on visual acuity      6 weeks OCT      Risks, benefits, and alternatives to treatment discussed in detail with the patient.  The patient voiced understanding and wished to proceed with the procedure    Injection Procedure Note:  Diagnosis: Wet AMD OS    Patient Identified and Time Out complete  Topical Proparacaine and Betadine.  Inject Eylea OS at 6:00 @ 3.5-4mm posterior to limbus  Post Operative Dx: Same  Complications: None  Follow up as  above.

## 2020-03-10 NOTE — PATIENT INSTRUCTIONS

## 2020-06-16 ENCOUNTER — PROCEDURE VISIT (OUTPATIENT)
Dept: OPHTHALMOLOGY | Facility: CLINIC | Age: 75
End: 2020-06-16
Attending: OPHTHALMOLOGY
Payer: MEDICARE

## 2020-06-16 VITALS — SYSTOLIC BLOOD PRESSURE: 111 MMHG | DIASTOLIC BLOOD PRESSURE: 69 MMHG | HEART RATE: 83 BPM

## 2020-06-16 DIAGNOSIS — H35.723 RETINAL PIGMENT EPITHELIAL DETACHMENT OF BOTH EYES: ICD-10-CM

## 2020-06-16 DIAGNOSIS — H35.3212 EXUDATIVE AGE-RELATED MACULAR DEGENERATION OF RIGHT EYE WITH INACTIVE CHOROIDAL NEOVASCULARIZATION: ICD-10-CM

## 2020-06-16 DIAGNOSIS — H35.3221 EXUDATIVE AGE-RELATED MACULAR DEGENERATION OF LEFT EYE WITH ACTIVE CHOROIDAL NEOVASCULARIZATION: Primary | ICD-10-CM

## 2020-06-16 PROCEDURE — 92012 PR EYE EXAM, EST PATIENT,INTERMED: ICD-10-PCS | Mod: 25,S$PBB,, | Performed by: OPHTHALMOLOGY

## 2020-06-16 PROCEDURE — 67028 INJECTION EYE DRUG: CPT | Mod: S$PBB,LT,, | Performed by: OPHTHALMOLOGY

## 2020-06-16 PROCEDURE — 92012 INTRM OPH EXAM EST PATIENT: CPT | Mod: 25,S$PBB,, | Performed by: OPHTHALMOLOGY

## 2020-06-16 PROCEDURE — 67028 INJECTION EYE DRUG: CPT | Mod: PBBFAC,LT | Performed by: OPHTHALMOLOGY

## 2020-06-16 PROCEDURE — 67028 PR INJECT INTRAVITREAL PHARMCOLOGIC: ICD-10-PCS | Mod: S$PBB,LT,, | Performed by: OPHTHALMOLOGY

## 2020-06-16 PROCEDURE — 92134 POSTERIOR SEGMENT OCT RETINA (OCULAR COHERENCE TOMOGRAPHY)-BOTH EYES: ICD-10-PCS | Mod: 26,S$PBB,, | Performed by: OPHTHALMOLOGY

## 2020-06-16 PROCEDURE — 92134 CPTRZ OPH DX IMG PST SGM RTA: CPT | Mod: PBBFAC | Performed by: OPHTHALMOLOGY

## 2020-06-16 RX ADMIN — AFLIBERCEPT 2 MG: 40 INJECTION, SOLUTION INTRAVITREAL at 08:06

## 2020-06-16 NOTE — PROGRESS NOTES
HPI      6wk Eylea   DLS- 03/10/20 Dr. Nino    Pt sts vision in both eyes declining worse since last visit both distance   and reading. Denies pain   (+)Flashes same no change (+)Floaters same no change   (-)Photophobia                  (need extra light to be able to see)  (-)Glare    No gtts        OCT - FVPED OD with noncentral SRF  OS - increased, stable fibrosis OS  Increased GA OS      A/P    1. FVPED OD - s/p Eylea x 13  With subretinal fibrosis - OD, also history of AION.   Increase SRH/ARF and CME OD 10/13  Now stable CME OD - reactive from SR Fibrosis  In combination with AION, potential limited.  Try observation again OD    2. - Wet ARMD w CNV OS  - Pt responding to Avastin; ; However, Pt responds poorly to extend on Avastin.  -- Pt interested in switching to Eylea -  However, cystoid edema likely response to SR fibrosis, rather than sylvia new CME  Will do maintenance therapy OS - but try extension  S/p Eylea OS x30  s/p Avastin #15    6/16/14 - Pt presented with decreased Va OS from 20/40-20/80  No increase in SRF, fibrosis grossly stable, CME improved  Will monitor closely    7/14/14 - Va OS returned to normal over last month    5/15 - Worsened OS   Resumed q 4 week tx OS  WIll need tighter maintenance - will try alternating therapy     5/16 - stable resume 3 month maintenance  4/18 - stable on current regiment, try slow extension  7/18 - increased SRF OS  6/19 - try extension again  1/20 -= increased SRF at 12 weeks  6/20 - increase at 12 weeks    Eylea OS    Resume shorter interval    -Patient visited [employer] and has magnifying glasses that help       3. H/o AION OD    4. NS OD  PCIOL OS - great result  Increased PCO with glare     5. Glc suspect - good IOP      Pt legally blind based on visual acuity      6 weeks OCT no dilate      Risks, benefits, and alternatives to treatment discussed in detail with the patient.  The patient voiced understanding and wished to proceed with the  procedure    Injection Procedure Note:  Diagnosis: Wet AMD OS    Patient Identified and Time Out complete  Topical Proparacaine and Betadine.  Inject Eylea OS at 6:00 @ 3.5-4mm posterior to limbus  Post Operative Dx: Same  Complications: None  Follow up as above.

## 2020-06-16 NOTE — PATIENT INSTRUCTIONS

## 2020-08-04 ENCOUNTER — PROCEDURE VISIT (OUTPATIENT)
Dept: OPHTHALMOLOGY | Facility: CLINIC | Age: 75
End: 2020-08-04
Payer: MEDICARE

## 2020-08-04 VITALS — DIASTOLIC BLOOD PRESSURE: 63 MMHG | SYSTOLIC BLOOD PRESSURE: 99 MMHG | HEART RATE: 55 BPM

## 2020-08-04 DIAGNOSIS — H35.3221 EXUDATIVE AGE-RELATED MACULAR DEGENERATION OF LEFT EYE WITH ACTIVE CHOROIDAL NEOVASCULARIZATION: Primary | ICD-10-CM

## 2020-08-04 DIAGNOSIS — H35.3212 EXUDATIVE AGE-RELATED MACULAR DEGENERATION OF RIGHT EYE WITH INACTIVE CHOROIDAL NEOVASCULARIZATION: ICD-10-CM

## 2020-08-04 PROCEDURE — 67028 INJECTION EYE DRUG: CPT | Mod: S$PBB,LT,, | Performed by: OPHTHALMOLOGY

## 2020-08-04 PROCEDURE — 67028 INJECTION EYE DRUG: CPT | Mod: PBBFAC,LT | Performed by: OPHTHALMOLOGY

## 2020-08-04 PROCEDURE — 92012 INTRM OPH EXAM EST PATIENT: CPT | Mod: 25,S$PBB,, | Performed by: OPHTHALMOLOGY

## 2020-08-04 PROCEDURE — 67028 PR INJECT INTRAVITREAL PHARMCOLOGIC: ICD-10-PCS | Mod: S$PBB,LT,, | Performed by: OPHTHALMOLOGY

## 2020-08-04 PROCEDURE — 92012 PR EYE EXAM, EST PATIENT,INTERMED: ICD-10-PCS | Mod: 25,S$PBB,, | Performed by: OPHTHALMOLOGY

## 2020-08-04 PROCEDURE — 92134 POSTERIOR SEGMENT OCT RETINA (OCULAR COHERENCE TOMOGRAPHY)-BOTH EYES: ICD-10-PCS | Mod: 26,S$PBB,, | Performed by: OPHTHALMOLOGY

## 2020-08-04 PROCEDURE — 92134 CPTRZ OPH DX IMG PST SGM RTA: CPT | Mod: PBBFAC | Performed by: OPHTHALMOLOGY

## 2020-08-04 RX ADMIN — AFLIBERCEPT 2 MG: 40 INJECTION, SOLUTION INTRAVITREAL at 08:08

## 2020-08-04 NOTE — PROGRESS NOTES
HPI      6wk Eylea   DLS- 06/16/20 Dr. Nino    Pt sts vision in both eyes declining worse since last visit both distance   and reading. Denies pain   (+)Flashes same no change (+)Floaters same no change   (-)Photophobia                  (need extra light to be able to see)  (-)Glare    No gtts          OCT - FVPED OD with noncentral SRF  OS - increased, stable fibrosis OS  Increased GA OS      A/P    1. FVPED OD - s/p Eylea x 13  With subretinal fibrosis - OD, also history of AION.   Increase SRH/ARF and CME OD 10/13  Now stable CME OD - reactive from SR Fibrosis  In combination with AION, potential limited.  Try observation again OD    2. - Wet ARMD w CNV OS  - Pt responding to Avastin; ; However, Pt responds poorly to extend on Avastin.  -- Pt interested in switching to Eylea -  However, cystoid edema likely response to SR fibrosis, rather than sylvia new CME  Will do maintenance therapy OS - but try extension  S/p Eylea OS x31  s/p Avastin #15    6/16/14 - Pt presented with decreased Va OS from 20/40-20/80  No increase in SRF, fibrosis grossly stable, CME improved  Will monitor closely    7/14/14 - Va OS returned to normal over last month    5/15 - Worsened OS   Resumed q 4 week tx OS  WIll need tighter maintenance - will try alternating therapy     5/16 - stable resume 3 month maintenance  4/18 - stable on current regiment, try slow extension  7/18 - increased SRF OS  6/19 - try extension again  1/20 -= increased SRF at 12 weeks  6/20 - increase at 12 weeks    Eylea OS    Try 8-9 weeks    -Patient visited [facility] and has magnifying glasses that help       3. H/o AION OD    4. NS OD  PCIOL OS - great result  Increased PCO with glare     5. Glc suspect - good IOP      Pt legally blind based on visual acuity      8-9 weeks OCT no dilate      Risks, benefits, and alternatives to treatment discussed in detail with the patient.  The patient voiced understanding and wished to proceed with the  procedure    Injection Procedure Note:  Diagnosis: Wet AMD OS    Patient Identified and Time Out complete  Topical Proparacaine and Betadine.  Inject Eylea OS at 6:00 @ 3.5-4mm posterior to limbus  Post Operative Dx: Same  Complications: None  Follow up as above.

## 2020-08-04 NOTE — PATIENT INSTRUCTIONS

## 2020-10-06 ENCOUNTER — PROCEDURE VISIT (OUTPATIENT)
Dept: OPHTHALMOLOGY | Facility: CLINIC | Age: 75
End: 2020-10-06
Payer: MEDICARE

## 2020-10-06 DIAGNOSIS — H35.723 RETINAL PIGMENT EPITHELIAL DETACHMENT OF BOTH EYES: ICD-10-CM

## 2020-10-06 DIAGNOSIS — H35.3212 EXUDATIVE AGE-RELATED MACULAR DEGENERATION OF RIGHT EYE WITH INACTIVE CHOROIDAL NEOVASCULARIZATION: ICD-10-CM

## 2020-10-06 DIAGNOSIS — H35.3221 EXUDATIVE AGE-RELATED MACULAR DEGENERATION OF LEFT EYE WITH ACTIVE CHOROIDAL NEOVASCULARIZATION: Primary | ICD-10-CM

## 2020-10-06 PROCEDURE — 92134 POSTERIOR SEGMENT OCT RETINA (OCULAR COHERENCE TOMOGRAPHY)-BOTH EYES: ICD-10-PCS | Mod: 26,S$PBB,, | Performed by: OPHTHALMOLOGY

## 2020-10-06 PROCEDURE — 92014 COMPRE OPH EXAM EST PT 1/>: CPT | Mod: 25,S$PBB,, | Performed by: OPHTHALMOLOGY

## 2020-10-06 PROCEDURE — 67028 INJECTION EYE DRUG: CPT | Mod: PBBFAC,LT | Performed by: OPHTHALMOLOGY

## 2020-10-06 PROCEDURE — 67028 PR INJECT INTRAVITREAL PHARMCOLOGIC: ICD-10-PCS | Mod: S$PBB,LT,, | Performed by: OPHTHALMOLOGY

## 2020-10-06 PROCEDURE — 92014 PR EYE EXAM, EST PATIENT,COMPREHESV: ICD-10-PCS | Mod: 25,S$PBB,, | Performed by: OPHTHALMOLOGY

## 2020-10-06 PROCEDURE — 92134 CPTRZ OPH DX IMG PST SGM RTA: CPT | Mod: PBBFAC | Performed by: OPHTHALMOLOGY

## 2020-10-06 PROCEDURE — 67028 INJECTION EYE DRUG: CPT | Mod: S$PBB,LT,, | Performed by: OPHTHALMOLOGY

## 2020-10-06 RX ADMIN — AFLIBERCEPT 2 MG: 40 INJECTION, SOLUTION INTRAVITREAL at 09:10

## 2020-10-06 NOTE — PROGRESS NOTES
HPI     9 wk Eylea   DLS- 08/04/20 Dr. Nino    Pt sts no improvement since last visit.   Denies pain   (+)Flashes same no change (+)Floaters same no change   (-)Photophobia  (need extra light to be able to see)  (-)Glare    No gtts       OCT - FVPED OD with noncentral SRF  OS - increased, stable fibrosis OS  Increased GA OS      A/P    1. FVPED OD - s/p Eylea x 13  With subretinal fibrosis - OD, also history of AION.   Increase SRH/ARF and CME OD 10/13  Now stable CME OD - reactive from SR Fibrosis  In combination with AION, potential limited.  Try observation again OD    2. - Wet ARMD w CNV OS  - Pt responding to Avastin; ; However, Pt responds poorly to extend on Avastin.  -- Pt interested in switching to Eylea -  However, cystoid edema likely response to SR fibrosis, rather than sylvia new CME  Will do maintenance therapy OS - but try extension  S/p Eylea OS x32  s/p Avastin #15    6/16/14 - Pt presented with decreased Va OS from 20/40-20/80  No increase in SRF, fibrosis grossly stable, CME improved  Will monitor closely    7/14/14 - Va OS returned to normal over last month    5/15 - Worsened OS   Resumed q 4 week tx OS  WIll need tighter maintenance - will try alternating therapy     5/16 - stable resume 3 month maintenance  4/18 - stable on current regiment, try slow extension  7/18 - increased SRF OS  6/19 - try extension again  1/20 -= increased SRF at 12 weeks  6/20 - increase at 12 weeks    Eylea OS    Try 6 weeks - Pt would like to see If he can be seen in [place] - contact Dr. Sanchez    -Patient visited [facility] and has magnifying glasses that help       3. H/o AION OD    4. NS OD  PCIOL OS - great result  Increased PCO with glare     5. Glc suspect - good IOP      Pt legally blind based on visual acuity      6 weeks OCT - with Dr. Sanchez      Risks, benefits, and alternatives to treatment discussed in detail with the patient.  The patient voiced understanding and wished to proceed with  the procedure    Injection Procedure Note:  Diagnosis: Wet AMD OS    Patient Identified and Time Out complete  Topical Proparacaine and Betadine.  Inject Eylea OS at 6:00 @ 3.5-4mm posterior to limbus  Post Operative Dx: Same  Complications: None  Follow up as above.

## 2020-10-06 NOTE — PATIENT INSTRUCTIONS

## 2021-04-30 PROBLEM — H35.3221 WET AGE-RELATED MACULAR DEGENERATION OF LEFT EYE WITH ACTIVE CHOROIDAL NEOVASCULARIZATION: Status: ACTIVE | Noted: 2018-04-24

## 2021-05-12 ENCOUNTER — PATIENT MESSAGE (OUTPATIENT)
Dept: RESEARCH | Facility: HOSPITAL | Age: 76
End: 2021-05-12

## 2021-08-19 NOTE — PATIENT INSTRUCTIONS

## 2023-06-11 NOTE — PATIENT INSTRUCTIONS
